# Patient Record
Sex: FEMALE | Race: ASIAN | Employment: FULL TIME | URBAN - METROPOLITAN AREA
[De-identification: names, ages, dates, MRNs, and addresses within clinical notes are randomized per-mention and may not be internally consistent; named-entity substitution may affect disease eponyms.]

---

## 2017-10-18 ENCOUNTER — OFFICE VISIT (OUTPATIENT)
Dept: FAMILY MEDICINE | Facility: CLINIC | Age: 22
End: 2017-10-18
Payer: COMMERCIAL

## 2017-10-18 VITALS
HEIGHT: 63 IN | BODY MASS INDEX: 22.32 KG/M2 | WEIGHT: 126 LBS | TEMPERATURE: 98.4 F | DIASTOLIC BLOOD PRESSURE: 62 MMHG | HEART RATE: 84 BPM | SYSTOLIC BLOOD PRESSURE: 110 MMHG | OXYGEN SATURATION: 99 %

## 2017-10-18 DIAGNOSIS — R53.83 FATIGUE, UNSPECIFIED TYPE: Primary | ICD-10-CM

## 2017-10-18 DIAGNOSIS — Z23 NEED FOR PROPHYLACTIC VACCINATION AND INOCULATION AGAINST INFLUENZA: ICD-10-CM

## 2017-10-18 LAB
BASOPHILS # BLD AUTO: 0 10E9/L (ref 0–0.2)
BASOPHILS NFR BLD AUTO: 0.5 %
DIFFERENTIAL METHOD BLD: NORMAL
EOSINOPHIL # BLD AUTO: 0.1 10E9/L (ref 0–0.7)
EOSINOPHIL NFR BLD AUTO: 1.1 %
ERYTHROCYTE [DISTWIDTH] IN BLOOD BY AUTOMATED COUNT: 12.2 % (ref 10–15)
HCT VFR BLD AUTO: 40.7 % (ref 35–47)
HGB BLD-MCNC: 13.5 G/DL (ref 11.7–15.7)
LYMPHOCYTES # BLD AUTO: 2.3 10E9/L (ref 0.8–5.3)
LYMPHOCYTES NFR BLD AUTO: 36.3 %
MCH RBC QN AUTO: 29.3 PG (ref 26.5–33)
MCHC RBC AUTO-ENTMCNC: 33.2 G/DL (ref 31.5–36.5)
MCV RBC AUTO: 88 FL (ref 78–100)
MONOCYTES # BLD AUTO: 0.5 10E9/L (ref 0–1.3)
MONOCYTES NFR BLD AUTO: 8.4 %
NEUTROPHILS # BLD AUTO: 3.4 10E9/L (ref 1.6–8.3)
NEUTROPHILS NFR BLD AUTO: 53.7 %
PLATELET # BLD AUTO: 243 10E9/L (ref 150–450)
RBC # BLD AUTO: 4.61 10E12/L (ref 3.8–5.2)
WBC # BLD AUTO: 6.4 10E9/L (ref 4–11)

## 2017-10-18 PROCEDURE — 82306 VITAMIN D 25 HYDROXY: CPT | Performed by: INTERNAL MEDICINE

## 2017-10-18 PROCEDURE — 82728 ASSAY OF FERRITIN: CPT | Performed by: INTERNAL MEDICINE

## 2017-10-18 PROCEDURE — 90471 IMMUNIZATION ADMIN: CPT | Performed by: INTERNAL MEDICINE

## 2017-10-18 PROCEDURE — 80050 GENERAL HEALTH PANEL: CPT | Performed by: INTERNAL MEDICINE

## 2017-10-18 PROCEDURE — 90686 IIV4 VACC NO PRSV 0.5 ML IM: CPT | Performed by: INTERNAL MEDICINE

## 2017-10-18 PROCEDURE — 99203 OFFICE O/P NEW LOW 30 MIN: CPT | Mod: 25 | Performed by: INTERNAL MEDICINE

## 2017-10-18 PROCEDURE — 36415 COLL VENOUS BLD VENIPUNCTURE: CPT | Performed by: INTERNAL MEDICINE

## 2017-10-18 NOTE — NURSING NOTE
"Chief Complaint   Patient presents with     Fatigue       Initial /62 (BP Location: Right arm, Patient Position: Chair, Cuff Size: Adult Regular)  Pulse 84  Temp 98.4  F (36.9  C) (Tympanic)  Ht 5' 3\" (1.6 m)  Wt 126 lb (57.2 kg)  LMP 10/08/2017  SpO2 99%  BMI 22.32 kg/m2 Estimated body mass index is 22.32 kg/(m^2) as calculated from the following:    Height as of this encounter: 5' 3\" (1.6 m).    Weight as of this encounter: 126 lb (57.2 kg).  Medication Reconciliation: complete  "

## 2017-10-18 NOTE — PROGRESS NOTES
"  SUBJECTIVE:   Rebecca Huerta is a 22 year old female who presents to clinic today for the following health issues:    Rebecca is here with fatigue. She has been bothered by this for the past year. At first thought it wasn't much to worry about, but has been persisting and feels like it is not normal she should feel this way. Describes feeling tired and wiped out. She usually gets 7-8 hours of sleep and wakes up feeling rested. 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 She did start this job about a year ago. She goes to the gym a couple times a week, feels like she cannot exercise as long as she used to, just feels worn out. Denies chest pain, palpitations, shortness of breath, MIRZA, muscle aches or weakness. She eats a regular diet, no decreased appetite or weight loss. She is having regular bowel movements. Is having regular periods without excessive flow. She does get headaches here and there. She denies rashes and joint pain.  She wondered if low iron or vitamin D might be contributing, has taken both of those supplements but didn't notice any improvement in her symptoms. She states her mood overall is good.    Rebecca is otherwise healthy. She does not take any regular medications.        Reviewed and updated as needed this visit by clinical staffTobacco  Allergies  Meds  Soc Hx      Reviewed and updated as needed this visit by Provider  Meds         ROS:  Constitutional, HEENT, cardiovascular, LAD, pulmonary, GI, , musculoskeletal, neuro, skin, endocrine and psych systems are negative, except as otherwise noted.      OBJECTIVE:   /62 (BP Location: Right arm, Patient Position: Chair, Cuff Size: Adult Regular)  Pulse 84  Temp 98.4  F (36.9  C) (Tympanic)  Ht 5' 3\" (1.6 m)  Wt 126 lb (57.2 kg)  LMP 10/08/2017  SpO2 99%  BMI 22.32 kg/m2  Body mass index is 22.32 kg/(m^2).    Gen: well appearing, pleasant young woman, no distress  HEENT: PERRL, sclera nonicteric, oropharynx clear, MMM  Neck: " supple, no LAD  Pulm: breathing comfortably, CTAB, no wheezes or rales  CV: RRR, normal S1 and S2, no murmurs  Abd: BS present, soft, nontender, nondistended, no HSM appreciated  Ext: 2+ distal pulses, no LE edema         ASSESSMENT/PLAN:       1. Fatigue, unspecified type  History and physical are nonspecific. We'll check basic labs today, follow up after results.  - CBC with platelets and differential  - TSH with free T4 reflex  - Vitamin D Deficiency  - Comprehensive metabolic panel (BMP + Alb, Alk Phos, ALT, AST, Total. Bili, TP)  - Ferritin      Advised she needs a follow-up visit for Pap smear. Has not had one previously.    Fouzia Trinidad MD  [facility]

## 2017-10-18 NOTE — MR AVS SNAPSHOT
"              After Visit Summary   10/18/2017    Rebecca Huerta    MRN: [id number]           Patient Information     Date Of Birth          1995        Visit Information        Provider Department      10/18/2017 4:20 PM Fouzia Trinidad MD [facility] [place]        Today's Diagnoses     Fatigue, unspecified type    -  1       Follow-ups after your visit        Who to contact     If you have questions or need follow up information about today's clinic visit or your schedule please contact [facility] [place] [place] directly at [phone].  Normal or non-critical lab and imaging results will be communicated to you by Printed Piecehart, letter or phone within 4 business days after the clinic has received the results. If you do not hear from us within 7 days, please contact the clinic through Printed Piecehart or phone. If you have a critical or abnormal lab result, we will notify you by phone as soon as possible.  Submit refill requests through Showcase Gig or call your pharmacy and they will forward the refill request to us. Please allow 3 business days for your refill to be completed.          Additional Information About Your Visit        MyChart Information     Show[employer] Gig lets you send messages to your doctor, view your test results, renew your prescriptions, schedule appointments and more. To sign up, go to [email or web address] . Click on \"Log in\" on the left side of the screen, which will take you to the Welcome page. Then click on \"Sign up Now\" on the right side of the page.     You will be asked to enter the access code listed below, as well as some personal information. Please follow the directions to create your username and password.     Your access code is: [email or web address]  Expires: 2018  4:52 PM     Your access code will  in 90 days. If you need help or a new code, please call your [facility] or [phone].        Care EveryWhere ID     This is your Care EveryWhere ID. This could be used by " "other organizations to access your [place] medical records  [id number]        Your Vitals Were     Pulse Temperature Height Last Period Pulse Oximetry BMI (Body Mass Index)    84 98.4  F (36.9  C) (Tympanic) 5' 3\" (1.6 m) 10/08/2017 99% 22.32 kg/m2       Blood Pressure from Last 3 Encounters:   10/18/17 110/62    Weight from Last 3 Encounters:   10/18/17 126 lb (57.2 kg)              We Performed the Following     CBC with platelets and differential     Comprehensive metabolic panel (BMP + Alb, Alk Phos, ALT, AST, Total. Bili, TP)     Ferritin     TSH with free T4 reflex     Vitamin D Deficiency        Primary Care Provider    Physician No Ref-Primary       NO REF-PRIMARY PHYSICIAN        Equal Access to Services     ADAM HARLEY : Hadii suzanne Rodriguez, waaxda luqadaha, qaybta kaalmada leslyeyagarrett, ramsey green . So [facility] [phone].    ATENCIÓN: Si habla español, tiene a vora disposición servicios gratuitos de asistencia lingüística. Llame al [phone].    We comply with applicable federal civil rights laws and [place] laws. We do not discriminate on the basis of race, color, national origin, age, disability, sex, sexual orientation, or gender identity.            Thank you!     Thank you for choosing [facility] [place] [place]  for your care. Our goal is always to provide you with excellent care. Hearing back from our patients is one way we can continue to improve our services. Please take a few minutes to complete the written survey that you may receive in the mail after your visit with us. Thank you!             Your Updated Medication List - Protect others around you: Learn how to safely use, store and throw away your medicines at www.disposemymeds.org.      Notice  As of 10/18/2017  4:52 PM    You have not been prescribed any medications.      "

## 2017-10-18 NOTE — PROGRESS NOTES
Injectable Influenza Immunization Documentation    1.  Is the person to be vaccinated sick today?   No    2. Does the person to be vaccinated have an allergy to a component   of the vaccine?   No    3. Has the person to be vaccinated ever had a serious reaction   to influenza vaccine in the past?   No    4. Has the person to be vaccinated ever had Guillain-Barré syndrome?   No    Form completed by mp

## 2017-10-19 LAB
ALBUMIN SERPL-MCNC: 4.2 G/DL (ref 3.4–5)
ALP SERPL-CCNC: 66 U/L (ref 40–150)
ALT SERPL W P-5'-P-CCNC: 18 U/L (ref 0–50)
ANION GAP SERPL CALCULATED.3IONS-SCNC: 9 MMOL/L (ref 3–14)
AST SERPL W P-5'-P-CCNC: 17 U/L (ref 0–45)
BILIRUB SERPL-MCNC: 0.8 MG/DL (ref 0.2–1.3)
BUN SERPL-MCNC: 9 MG/DL (ref 7–30)
CALCIUM SERPL-MCNC: 9.3 MG/DL (ref 8.5–10.1)
CHLORIDE SERPL-SCNC: 102 MMOL/L (ref 94–109)
CO2 SERPL-SCNC: 28 MMOL/L (ref 20–32)
CREAT SERPL-MCNC: 0.66 MG/DL (ref 0.52–1.04)
FERRITIN SERPL-MCNC: 26 NG/ML (ref 12–150)
GFR SERPL CREATININE-BSD FRML MDRD: >90 ML/MIN/1.7M2
GLUCOSE SERPL-MCNC: 89 MG/DL (ref 70–99)
POTASSIUM SERPL-SCNC: 3.7 MMOL/L (ref 3.4–5.3)
PROT SERPL-MCNC: 8.4 G/DL (ref 6.8–8.8)
SODIUM SERPL-SCNC: 139 MMOL/L (ref 133–144)
TSH SERPL DL<=0.005 MIU/L-ACNC: 3.32 MU/L (ref 0.4–4)

## 2017-10-20 ENCOUNTER — MYC MEDICAL ADVICE (OUTPATIENT)
Dept: FAMILY MEDICINE | Facility: CLINIC | Age: 22
End: 2017-10-20

## 2017-10-20 LAB — DEPRECATED CALCIDIOL+CALCIFEROL SERPL-MC: 17 UG/L (ref 20–75)

## 2017-10-23 NOTE — TELEPHONE ENCOUNTER
Please see My Chart message below and advise as appropriate.  Taylor Diallo RN - Triage  [facility]    10/18/2017 LOV NOTES:  . Fatigue, unspecified type  History and physical are nonspecific. We'll check basic labs today, follow up after results.  - CBC with platelets and differential  - TSH with free T4 reflex  - Vitamin D Deficiency  - Comprehensive metabolic panel (BMP + Alb, Alk Phos, ALT, AST, Total. Bili, TP)  - Ferritin

## 2017-10-30 ENCOUNTER — ALLIED HEALTH/NURSE VISIT (OUTPATIENT)
Dept: NURSING | Facility: CLINIC | Age: 22
End: 2017-10-30
Payer: COMMERCIAL

## 2017-10-30 DIAGNOSIS — Z23 NEED FOR VACCINATION: Primary | ICD-10-CM

## 2017-10-30 PROCEDURE — 90714 TD VACC NO PRESV 7 YRS+ IM: CPT

## 2017-10-30 PROCEDURE — 90471 IMMUNIZATION ADMIN: CPT

## 2017-10-30 PROCEDURE — 90651 9VHPV VACCINE 2/3 DOSE IM: CPT

## 2017-10-30 PROCEDURE — 90472 IMMUNIZATION ADMIN EACH ADD: CPT

## 2018-01-28 ENCOUNTER — HEALTH MAINTENANCE LETTER (OUTPATIENT)
Age: 23
End: 2018-01-28

## 2018-10-27 ENCOUNTER — APPOINTMENT (OUTPATIENT)
Dept: GENERAL RADIOLOGY | Facility: CLINIC | Age: 23
End: 2018-10-27
Attending: PHYSICIAN ASSISTANT
Payer: COMMERCIAL

## 2018-10-27 ENCOUNTER — HOSPITAL ENCOUNTER (EMERGENCY)
Facility: CLINIC | Age: 23
Discharge: HOME | End: 2018-10-27
Attending: EMERGENCY MEDICINE | Admitting: EMERGENCY MEDICINE
Payer: COMMERCIAL

## 2018-10-27 VITALS
SYSTOLIC BLOOD PRESSURE: 107 MMHG | WEIGHT: 123 LBS | TEMPERATURE: 98.9 F | HEIGHT: 63 IN | BODY MASS INDEX: 21.79 KG/M2 | DIASTOLIC BLOOD PRESSURE: 77 MMHG | RESPIRATION RATE: 19 BRPM | OXYGEN SATURATION: 100 % | HEART RATE: 132 BPM

## 2018-10-27 DIAGNOSIS — R09.A2 GLOBUS PHARYNGEUS: ICD-10-CM

## 2018-10-27 LAB
DEPRECATED S PYO AG THROAT QL EIA: NORMAL
SPECIMEN SOURCE: NORMAL
TSH SERPL DL<=0.005 MIU/L-ACNC: 2.88 MU/L (ref 0.4–4)

## 2018-10-27 PROCEDURE — 99284 EMERGENCY DEPT VISIT MOD MDM: CPT

## 2018-10-27 PROCEDURE — 70360 X-RAY EXAM OF NECK: CPT

## 2018-10-27 PROCEDURE — 87081 CULTURE SCREEN ONLY: CPT | Performed by: PHYSICIAN ASSISTANT

## 2018-10-27 PROCEDURE — 87880 STREP A ASSAY W/OPTIC: CPT | Performed by: PHYSICIAN ASSISTANT

## 2018-10-27 PROCEDURE — 84443 ASSAY THYROID STIM HORMONE: CPT | Performed by: PHYSICIAN ASSISTANT

## 2018-10-27 ASSESSMENT — ENCOUNTER SYMPTOMS
COUGH: 0
TROUBLE SWALLOWING: 0
HEADACHES: 1
SORE THROAT: 0

## 2018-10-27 NOTE — ED AVS SNAPSHOT
Emergency Department    [address] [place] [address]    Phone:  [phone]    Fax:  [phone]                                       Rebecca Huerta   MRN: [id number]    Department:   Emergency Department   Date of Visit:  10/27/2018           After Visit Summary Signature Page     I have received my discharge instructions, and my questions have been answered. I have discussed any challenges I see with this plan with the nurse or doctor.    ..........................................................................................................................................  Patient/Patient Representative Signature      ..........................................................................................................................................  Patient Representative Print Name and Relationship to Patient    ..................................................               ................................................  Date                                   Time    ..........................................................................................................................................  Reviewed by Signature/Title    ...................................................              ..............................................  Date                                               Time          22EPIC Rev 08/18

## 2018-10-27 NOTE — ED AVS SNAPSHOT
Emergency Department    [address] [facility] [address]    Phone:  [phone]    Fax:  [phone]                                       Rebecca Huerta   MRN: [id number]    Department:   Emergency Department   Date of Visit:  10/27/2018           Patient Information     Date Of Birth          1995        Your diagnoses for this visit were:     Globus pharyngeus        You were seen by Sergei Etienne MD.      Follow-up Information     Follow up with Sharri Umanzor MD.    Specialty:  Otolaryngology    Why:  As needed    Contact information:    [facility]  [address] [address]  [phone]          Follow up with  Emergency Department.    Specialty:  EMERGENCY MEDICINE    Why:  As needed    Contact information:    [address] [facility] [id number] [phone]      Your next 10 appointments already scheduled     Oct 31, 2018  8:45 AM CDT   New Patient with Acacia Lipscomb MD   [facility]    [address] [place] [address] [phone]              24 Hour Appointment Hotline       To make an appointment at any [facility], call [phone] ([phone]). If you don't have a family doctor or clinic, we will help you find one. [facility] are conveniently located to serve the needs of you and your family.             Review of your medicines      Notice     You have not been prescribed any medications.            Procedures and tests performed during your visit     Beta strep group A culture    Neck soft tissue XR    Rapid strep screen    TSH with free T4 reflex      Orders Needing Specimen Collection     None      Pending Results     Date and Time Order Name Status Description    10/27/2018 1954 Neck soft tissue XR Preliminary     10/27/2018 1950 Beta strep group A culture In process             Pending Culture Results     Date  and Time Order Name Status Description    10/27/2018 1950 Beta strep group A culture In process             Pending Results Instructions     If you had any lab results that were not finalized at the time of your Discharge, you can call the ED Lab Result RN at [phone]. You will be contacted by this team for any positive Lab results or changes in treatment. The nurses are available 7 days a week from 10A to 6:30P.  You can leave a message 24 hours per day and they will return your call.        Test Results From Your Hospital Stay        10/27/2018  8:16 PM      Component Results     Component    Specimen Description    Throat    Rapid Strep A Screen    NEGATIVE: No Group A streptococcal antigen detected by immunoassay, await culture report.         10/27/2018  8:21 PM      Narrative     NECK SOFT TISSUE 10/27/2018 8:12 PM     COMPARISON: None.    HISTORY: Sensation of deep throat swelling.        Impression     IMPRESSION: Soft tissue contours of the aerodigestive tract from the  nasopharynx to the upper trachea appear normal. No radiopaque foreign  bodies identified. No soft tissue gas identified. There is no  prevertebral soft tissue swelling.          10/27/2018  8:17 PM         10/27/2018  9:32 PM      Component Results     Component Value Ref Range & Units Status    TSH 2.88 0.40 - 4.00 mU/L Final                Clinical Quality Measure: Blood Pressure Screening     Your blood pressure was checked while you were in the emergency department today. The last reading we obtained was  BP: 119/78 . Please read the guidelines below about what these numbers mean and what you should do about them.  If your systolic blood pressure (the top number) is less than 120 and your diastolic blood pressure (the bottom number) is less than 80, then your blood pressure is normal. There is nothing more that you need to do about it.  If your systolic blood pressure (the top number) is 120-139 or your diastolic blood pressure (the  bottom number) is 80-89, your blood pressure may be higher than it should be. You should have your blood pressure rechecked within a year by a primary care provider.  If your systolic blood pressure (the top number) is 140 or greater or your diastolic blood pressure (the bottom number) is 90 or greater, you may have high blood pressure. High blood pressure is treatable, but if left untreated over time it can put you at risk for heart attack, stroke, or kidney failure. You should have your blood pressure rechecked by a primary care provider within the next 4 weeks.  If your provider in the emergency department today gave you specific instructions to follow-up with your doctor or provider even sooner than that, you should follow that instruction and not wait for up to 4 weeks for your follow-up visit.        Thank you for choosing [facility]       Thank you for choosing [facility] for your care. Our goal is always to provide you with excellent care. Hearing back from our patients is one way we can continue to improve our services. Please take a few minutes to complete the written survey that you may receive in the mail after you visit with us. Thank you!        Waffle Information     Waffle gives you secure access to your electronic health record. If you see a primary care provider, you can also send messages to your care team and make appointments. If you have questions, please call your primary care clinic.  If you do not have a primary care provider, please call [phone] and they will assist you.        Care EveryWhere ID     This is your Care EveryWhere ID. This could be used by other organizations to access your [facility] medical records  [id number]        Equal Access to Services     ADAM HARLEY : Hadii suzanne Rodriguez, waaxda venkata, qaybta kaalramsey kumar . So [facility] [phone].    ATENCIÓN: Si habla español, tiene a vora disposición servicios gratuitos  de asistencia lingüística. Stanislaw ronquillo [phone].    We comply with applicable federal civil rights laws and [place] laws. We do not discriminate on the basis of race, color, national origin, age, disability, sex, sexual orientation, or gender identity.            After Visit Summary       This is your record. Keep this with you and show to your community pharmacist(s) and doctor(s) at your next visit.

## 2018-10-28 NOTE — ED NOTES
"PT presented to the triage desk stating \"I can not breathe.\" Vitaled PT, Oxygen 100%, heart rate 98bpm, respiratory rate 19/min. PT does not appear to be in distress, monitored PT. RN notified. PT roomed.  "

## 2018-10-28 NOTE — ED PROVIDER NOTES
"  History     Chief Complaint:  Oral Swelling     HPI   Rebecca Huerta is a 23 year old female up to date on immunizations who presents to the emergency department today for evaluation of oral swelling. The patient reports that she developed a swelling and throbbing in her throat and a \"tight\" headache that has persisted intermittently since onset. She states that today the swelling worsened significantly, noting a feeling as though \"something is in her throat\" and difficulty breathing \"like she is panicking,\" prompting presentation. She denies any cough, nasal congestion, sore throat, fever, difficulty eating, drinking, or swallowing, or known sick contacts.     Allergies:  No Known Drug Allergies    Medications:    Medications reviewed. No current medications.     Past Medical History:    Medical history reviewed. No pertinent medical history.    Past Surgical History:    Surgical history reviewed. No pertinent surgical history.    Family History:    Family history reviewed. No pertinent family history.     Social History:  Smoking Status: Never Smoker  Smokeless Tobacco: Never Used  Alcohol Use: Negative  Drug Use: Negative  Marital Status:  Single      Review of Systems   HENT: Negative for congestion, sore throat and trouble swallowing.         Throat swelling and throbbing   Respiratory: Negative for cough.    Neurological: Positive for headaches.   All other systems reviewed and are negative.    Physical Exam     Patient Vitals for the past 24 hrs:   BP Temp Temp src Pulse Heart Rate Resp SpO2 Height Weight   10/27/18 2144 - - - - - - 100 % - -   10/27/18 2143 107/77 - - - - - - - -   10/27/18 1940 - - - - 98 19 100 % - -   10/27/18 1913 119/78 98.9  F (37.2  C) Oral 132 - 18 100 % 1.6 m (5' 3\") 55.8 kg (123 lb)     Physical Exam  General: Mildly anxious, no acute distress.  Normal mood and affect.  Skin: Good turgor, no rash, no unusual bruising or prominent lesions.  HEENT: Head: Normocephalic, atraumatic, no " visible masses.   Eyes: Conjunctiva clear, sclera non-icteric, EOM intact, PERRL.   Ears: EACs clear, TMs translucent.   Throat/pharynx: Mucous membranes moist, no mucosal lesions. Mucosa non-inflamed, no tonsillar hypertrophy or exudate.   Neck: Supple, without lymphadenopathy.  Cardiac: Tachycardic, regular rhythm, no murmur or gallop.   Lungs: Clear to auscultation.  Abdomen: Bowel sounds normal, no tenderness, organomegaly, masses, or hernia. No guarding or rebound tenderness.   Musculoskeletal: Normal gait and station. No calf tenderness or swelling.   Neurologic: Oriented x 3. GCS: 15.  Psychiatric: Intact recent and remote memory, judgment and insight. Mildly anxious and tearful.     Emergency Department Course     Imaging:  Radiology findings were communicated with the patient who voiced understanding of the findings.    Neck soft tissue XR  Soft tissue contours of the aerodigestive tract from the  nasopharynx to the upper trachea appear normal. No radiopaque foreign  bodies identified. No soft tissue gas identified. There is no  prevertebral soft tissue swelling.   Reading per radiology    Laboratory:  Laboratory findings were communicated with the patient who voiced understanding of the findings.    TSH with Free T4 Reflex: 2.88  Rapid Strep Screen: negative  Beta Strep Group A Culture: pending    Emergency Department Course:    1943 Nursing notes and vitals reviewed.    1948 I performed an exam of the patient as documented above.     1950 A throat sample was obtained for laboratory testing as documented above.    2008 The patient was sent for a soft tissue xray while in the emergency department, results above.     2146 I personally reviewed the laboratory and imaging results with the patient and answered all related questions prior to discharge.    Impression & Plan      Medical Decision Making:  Rebecca Huerta is a 23 year old female who presents to the emergency department today for evaluation of sensation  of throat fullness/swelling.  Details of the patient's history can be noted in the HPI.  Differential diagnosis included strep pharyngitis, retropharyngeal abscess, peritonsillar abscess, epiglottitis, foreign body, anaphylaxis, thyroid abnormalities, amongst others.  Upon my exam, the patient was mildly anxious but well-appearing.  She is having no difficulty breathing, swallowing, talking.  Rapid strep test was completed and returned negative.  Neck soft tissue x-ray was completed to evaluate for any signs of swelling to suggest epiglottitis or retropharyngeal abscess.  This returned within normal limits. In light of her throat fullness and tachycardia, TSH was also completed.  This returned within normal limits.  I believe that the patient's tachycardia may largely due to her anxiety over her symptoms.  There is an unclear cause of the patient's discomfort here today.  She appears to be breathing well, is vitally stable, is in no acute respiratory distress.  I feel comfortable with her discharge and outpatient management.  She was told to follow-up with her primary care provider early next week for recheck of symptoms.  She is also given a local ENT information that she may contact next week if she continues to have symptoms.  Upper endoscopy could be considered at that time.  She will return for any changing or worsening symptoms, difficulty breathing, difficulty swallowing, fevers >101F, new concerns.  She will try and take Benadryl at home if your symptoms persist.  All questions were answered prior the patient's discharge.  She was in agreement with the treatment plan as stated above.    Diagnosis:    ICD-10-CM    1. 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    Discharge Medications:  No discharge medications.    Scribe Disclosure:  Alessandra BALES, am serving as a scribe at 7:46 PM on 10/27/2018 to document services personally performed by Carly Wallace PA based on  my observations and the provider's statements to me.     EMERGENCY DEPARTMENT       Carly Wallace PA  10/27/18 7822

## 2018-10-28 NOTE — ED PROVIDER NOTES
"Emergency Department Attending Supervision Note  10/27/2018  8:22 PM      I evaluated this patient in conjunction with Advanced Practice Clinician:    Briefly, the patient presented with deep throat fullness.  Symptoms were intermittent but more persistent today. Non-smoker. No fevers or sore throat.      Examination:   ED Triage Vitals   Enc Vitals Group      BP 10/27/18 1913 119/78      Pulse 10/27/18 1913 132      Resp 10/27/18 1913 18      Temp 10/27/18 1913 98.9  F (37.2  C)      Temp src 10/27/18 1913 Oral      SpO2 10/27/18 1913 100 %      Weight 10/27/18 1913 55.8 kg (123 lb)      Height 10/27/18 1913 1.6 m (5' 3\")      Head Cir --       Peak Flow --       Pain Score --       Pain Loc --       Pain Edu? --       Excl. in GC? --      Head:  The scalp, face, and head appear normal  Eyes:  The pupils are normal    Conjunctivae and sclera appear normal  ENT:    The nose is normal    Ears/pinnae are normal    No masses palpated  Neck:  Normal range of motion  Lungs: The lungs are clear  Heart:  Regular rate, normal rhythm, no murmur  Skin:  No rash or acute lesions noted.  Neuro: Speech is normal and fluent.  No focal deficits.    My impression/diagnosis is:    Rebecca Huerta is a 23 year old female who presents to the ED with abnormal sensation in the throat.  No physical exam abnormalities. No masses. Speech and voice is normal. Swallowing normally in the ED.  No stridor. Rapid strep is negative. Encouraged close monitoring of symptoms and follow up with PCP. She was also given information for ENT follow up.    1. Globus pharyngeus         MD Jaimie Abebe Aaron Joseph, MD  10/27/18 8239    "

## 2018-10-29 LAB
BACTERIA SPEC CULT: NORMAL
Lab: NORMAL
SPECIMEN SOURCE: NORMAL

## 2018-10-31 ENCOUNTER — OFFICE VISIT (OUTPATIENT)
Dept: INTERNAL MEDICINE | Facility: CLINIC | Age: 23
End: 2018-10-31
Payer: COMMERCIAL

## 2018-10-31 VITALS
HEIGHT: 62 IN | HEART RATE: 80 BPM | RESPIRATION RATE: 16 BRPM | OXYGEN SATURATION: 100 % | SYSTOLIC BLOOD PRESSURE: 106 MMHG | DIASTOLIC BLOOD PRESSURE: 76 MMHG | BODY MASS INDEX: 22.6 KG/M2 | TEMPERATURE: 98.4 F | WEIGHT: 122.8 LBS

## 2018-10-31 DIAGNOSIS — Z23 NEED FOR VACCINATION: ICD-10-CM

## 2018-10-31 DIAGNOSIS — Z00.00 ROUTINE HISTORY AND PHYSICAL EXAMINATION OF ADULT: Primary | ICD-10-CM

## 2018-10-31 DIAGNOSIS — F41.9 ANXIETY: ICD-10-CM

## 2018-10-31 PROCEDURE — 90471 IMMUNIZATION ADMIN: CPT | Performed by: INTERNAL MEDICINE

## 2018-10-31 PROCEDURE — 99395 PREV VISIT EST AGE 18-39: CPT | Mod: 25 | Performed by: INTERNAL MEDICINE

## 2018-10-31 PROCEDURE — 90651 9VHPV VACCINE 2/3 DOSE IM: CPT | Performed by: INTERNAL MEDICINE

## 2018-10-31 RX ORDER — ALPRAZOLAM 0.25 MG
0.25 TABLET ORAL DAILY PRN
Qty: 30 TABLET | Refills: 0 | Status: SHIPPED | OUTPATIENT
Start: 2018-10-31 | End: 2019-12-15

## 2018-10-31 NOTE — PROGRESS NOTES
"  SUBJECTIVE:                                                      HPI: Rebecca Huerta is a pleasant 23 year old female who presents for a physical.    Patient complains of frequent episodes of \"panic\" over the last several weeks.  No obvious new stressors or changes in her life. Feeling of panic comes on suddenly and can last hours. Symptoms include shortness of breath, racing heart, sweating, tension headache, and feeling of doom.    Reports that her mom likely suffers from anxiety but has never been treated. Patient recalls her mom presenting to the ER multiple times with the same symptoms.    ROS:  Constitutional: denies unintentional weight loss or gain; denies fevers, chills, or sweats     Cardiovascular: denies chest pain, palpitations, or edema  Respiratory: denies cough, wheezing, shortness of breath, or dyspnea on exertion  Gastrointestinal: denies nausea, vomiting, constipation, diarrhea, or abdominal pain  Genitourinary: denies urinary frequency, urgency, dysuria, or hematuria  Integumentary: denies rash or pruritus  Musculoskeletal: denies back pain, muscle pain, joint pain, or joint swelling  Neurologic: denies focal weakness, numbness, or tingling  Hematologic/Immunologic: denies history of anemia or blood transfusions  Endocrine: denies heat or cold intolerance; denies polyuria, polydipsia  Psychiatric: see above    Past Medical History:   Diagnosis Date     NO ACTIVE PROBLEMS      Past Surgical History:   Procedure Laterality Date     NO HISTORY OF SURGERY       Family History   Problem Relation Age of Onset     Colon Cancer Father 64     Diabetes No family hx of      Cerebrovascular Disease No family hx of      Myocardial Infarction No family hx of      Coronary Artery Disease Early Onset No family hx of      Breast Cancer No family hx of      Ovarian Cancer No family hx of      Social History Main Topics     Smoking status: Never Smoker     Smokeless tobacco: Never Used     Alcohol use No     Drug " "use: No     Sexual activity: No     No Known Allergies     MEDS: None    Immunization History   Administered Date(s) Administered     HPV9 10/30/2017     Hep B, Peds or Adolescent 1993, 1995, 1993     HepA-ped 2 Dose 05/18/2006     Influenza Vaccine IM 3yrs+ 4 Valent IIV4 10/18/2011, 12/17/2012, 10/18/2017     MMR 08/08/1994     Poliovirus, inactivated (IPV) 1993, 1993, 08/08/1995     TD (ADULT, 7+) 10/30/2017     TDAP Vaccine (Adacel) 07/19/2005     Typhoid IM 05/18/2006     OBJECTIVE:                                                      /76  Pulse 80  Temp 98.4  F (36.9  C) (Oral)  Resp 16  Ht 5' 2\" (1.575 m)  Wt 122 lb 12.8 oz (55.7 kg)  LMP 10/01/2018  SpO2 100%  BMI 22.46 kg/m2  Constitutional: well-appearing  Eyes: normal conjunctivae and lids; pupils equal, round, and reactive to light  Ears, Nose, Mouth, and Throat: normal ears and nose; tympanic membranes visualized and normal; normal lips, teeth, and gums; no oropharyngeal lesions or ulcers  Neck: supple and symmetric; no lymphadenopathy; no thyromegaly or masses  Respiratory: normal respiratory effort; clear to auscultation bilaterally  Cardiovascular: regular rate and rhythm; pedal pulses palpable; no edema  Gastrointestinal: soft, non-tender, non-distended, and bowel sounds present; no organomegaly or masses  Musculoskeletal: normal gait and station  Psych: normal judgment and insight; normal mood and affect; recent and remote memory intact; oriented to time, place, and person    PREVENTATIVE HEALTH                                                      BMI: within normal limits   Blood pressure: within normal limits   Breast CA screening: not medically indicated at this time   Cervical CA screening: Deferring until sexually active  Colon CA screening: not medically indicated at this time   Lung CA screening: n/a   Dexa: not medically indicated at this time   Screening HCV: n/a   Screening HIV: not medically " indicated at this time   Screening cholesterol: not medically indicated at this time   Screening diabetes: not medically indicated at this time   STD testing: no risk factors present  Depression screening: PHQ-2 assessment completed and reviewed - no intervention indicated at this time  Alcohol misuse screening: alcohol use reviewed - no intervention indicated at this time  Immunizations: reviewed; HPV#2 DUE; flu shot DUE - will get at work    ASSESSMENT/PLAN:                                                       (Z00.00) Routine history and physical examination of adult  (primary encounter diagnosis)  Comment: PMH, PSH, FH, SH, medications, allergies, immunizations, and preventative health measures reviewed.   Plan: see below for plans.     (Z23) Need for vaccination  Plan: HPV#2 today.     (F41.9) Anxiety  Plan: Xanax sparingly as needed for severe anxiety.    The instructions on the AVS were discussed and explained to the patient. Patient expressed understanding of instructions.    (Chart documentation was completed, in part, with [employer] voice-recognition software. Even though reviewed, some grammatical, spelling, and word errors may remain.)    Acacia Lipscomb MD   [address] [address]  T: [phone], F: [phone]

## 2018-10-31 NOTE — MR AVS SNAPSHOT
After Visit Summary   10/31/2018    Rebecca Huerta    MRN: [id number]           Patient Information     Date Of Birth          1995        Visit Information        Provider Department      10/31/2018 8:45 AM Acacia Lipscomb MD [facility]        Today's Diagnoses     Routine history and physical examination of adult    -  1    Need for vaccination        Anxiety          Care Instructions    HPV#2 today.    #3 in ~4 months.    ---    Xanax SPARINGLY as need for extreme anxiety. Do not drive or drink alcohol while taking this.           Follow-ups after your visit        Who to contact     If you have questions or need follow up information about today's clinic visit or your schedule please contact [facility] directly at [phone].  Normal or non-critical lab and imaging results will be communicated to you by MyChart, letter or phone within 4 business days after the clinic has received the results. If you do not hear from us within 7 days, please contact the clinic through MyChart or phone. If you have a critical or abnormal lab result, we will notify you by phone as soon as possible.  Submit refill requests through [employer] or call your pharmacy and they will forward the refill request to us. Please allow 3 business days for your refill to be completed.          Additional Information About Your Visit        MyChart Information     [employer] gives you secure access to your electronic health record. If you see a primary care provider, you can also send messages to your care team and make appointments. If you have questions, please call your primary care clinic.  If you do not have a primary care provider, please call [phone] and they will assist you.        Care EveryWhere ID     This is your Care EveryWhere ID. This could be used by other organizations to access your [place] medical records  [id number]        Your Vitals Were     Pulse  "Temperature Respirations Height Last Period Pulse Oximetry    80 98.4  F (36.9  C) (Oral) 16 5' 2\" (1.575 m) 10/01/2018 100%    BMI (Body Mass Index)                   22.46 kg/m2            Blood Pressure from Last 3 Encounters:   10/31/18 106/76   10/27/18 107/77   10/18/17 110/62    Weight from Last 3 Encounters:   10/31/18 122 lb 12.8 oz (55.7 kg)   10/27/18 123 lb (55.8 kg)   10/18/17 126 lb (57.2 kg)              We Performed the Following     1st  Administration  [[id number]]     HUMAN PAPILLOMA VIRUS (GARDASIL 9) VACCINE [[id number]]          Today's Medication Changes          These changes are accurate as of 10/31/18  9:11 AM.  If you have any questions, ask your nurse or doctor.               Start taking these medicines.        Dose/Directions    ALPRAZolam 0.25 MG tablet   Commonly known as:  XANAX   Used for:  Anxiety   Started by:  Acacia Lipscomb MD        Dose:  0.25 mg   Take 1 tablet (0.25 mg) by mouth daily as needed for anxiety   Quantity:  30 tablet   Refills:  0            Where to get your medicines      Some of these will need a paper prescription and others can be bought over the counter.  Ask your nurse if you have questions.     Bring a paper prescription for each of these medications     ALPRAZolam 0.25 MG tablet                Primary Care Provider Office Phone # Fax #    [facility] [phone]       [address] [place] [address]        Equal Access to Services     PAGE HARLEY AH: Hadmack thomas Sodelbert, waaxda luqadaha, qaybta kaalmada aderoniyagarrett, waxay idiin hayaan aderoni barker. So [facility] [phone].    ATENCIÓN: Si habla español, tiene a vora disposición servicios gratuitos de asistencia lingüística. Llame al [phone].    We comply with applicable federal civil rights laws and [place] laws. We do not discriminate on the basis of race, color, national origin, age, disability, sex, sexual orientation, or gender " identity.            Thank you!     Thank you for choosing [facility]  for your care. Our goal is always to provide you with excellent care. Hearing back from our patients is one way we can continue to improve our services. Please take a few minutes to complete the written survey that you may receive in the mail after your visit with us. Thank you!             Your Updated Medication List - Protect others around you: Learn how to safely use, store and throw away your medicines at www.disposemymeds.org.          This list is accurate as of 10/31/18  9:11 AM.  Always use your most recent med list.                   Brand Name Dispense Instructions for use Diagnosis    ALPRAZolam 0.25 MG tablet    XANAX    30 tablet    Take 1 tablet (0.25 mg) by mouth daily as needed for anxiety    Anxiety

## 2018-10-31 NOTE — PATIENT INSTRUCTIONS
HPV#2 today.    #3 in ~4 months.    ---    Xanax SPARINGLY as need for extreme anxiety. Do not drive or drink alcohol while taking this.

## 2019-03-20 ENCOUNTER — TELEPHONE (OUTPATIENT)
Dept: INTERNAL MEDICINE | Facility: CLINIC | Age: 24
End: 2019-03-20

## 2019-03-20 NOTE — TELEPHONE ENCOUNTER
Panel Management Review          Composite cancer screening  Chart review shows that this patient is due/due soon for the following Pap Smear  Summary:    Patient is due/failing the following:   PAP    Action needed:   Patient needs office visit for PAP.    Type of outreach:    Sent letter.    Questions for provider review:    None                                                                                                                                    Alexandra Dhaliwal MA

## 2019-03-20 NOTE — LETTER
[facility]  [address] [address]  [phone]  March 20, 2019    Rebecca Huerta  [address]  [address]  [place] [address]    Dear Rebecca,    We care about your health and based on a review of your medical records, recommend the the following, to better manage your health:      You are in particular need of attention regarding:  -Cervical Cancer Screening    I am recommending that you:     -schedule a PAP SMEAR EXAM which is due.  Please disregard this reminder if you have had this exam elsewhere within the last year.  It would be helpful for us to have a copy of your recent pap smear report in our file so that we can best coordinate your care.    If you are under/uninsured, we recommend you contact the [facility]. They offer pap smears at no charge or on a sliding fee charge. You can schedule with them at [phone]. Please have them send us the results.      Here is a list of Health Maintenance topics that are due now or due soon:  Health Maintenance Due   Topic Date Due     HIV SCREEN (SYSTEM ASSIGNED)  01/04/2011     Pap Smear - every 3 years  01/04/2014     HPV Vaccine (3 - Female 3-dose series) 01/23/2019       Please call us at [phone] or [phone] (or use [employer]) to address the above recommendations.     Thank you for trusting [facility].  We appreciate the opportunity to serve you and look forward to supporting your healthcare needs in the future.    If you have (or plan to have) any of these tests done at a facility other than a [facility], [place] [facility] or a [facility], please have the results from these tests sent to your primary physician at [facility].    Healthy Regards,    Acacia Lipscomb MD

## 2019-08-05 ENCOUNTER — E-VISIT (OUTPATIENT)
Dept: FAMILY MEDICINE | Facility: CLINIC | Age: 24
End: 2019-08-05
Payer: COMMERCIAL

## 2019-08-05 DIAGNOSIS — R51.9 HEADACHE, UNSPECIFIED HEADACHE TYPE: Primary | ICD-10-CM

## 2019-08-05 PROCEDURE — 99444 ZZC PHYSICIAN ONLINE EVALUATION & MANAGEMENT SERVICE: CPT | Performed by: INTERNAL MEDICINE

## 2019-08-05 RX ORDER — CYCLOBENZAPRINE HCL 5 MG
5-10 TABLET ORAL 3 TIMES DAILY PRN
Qty: 30 TABLET | Refills: 1 | Status: SHIPPED | OUTPATIENT
Start: 2019-08-05 | End: 2019-11-06

## 2019-08-05 RX ORDER — INDOMETHACIN 25 MG/1
50 CAPSULE ORAL 2 TIMES DAILY PRN
Qty: 30 CAPSULE | Refills: 1 | Status: SHIPPED | OUTPATIENT
Start: 2019-08-05 | End: 2019-11-06

## 2019-11-06 ENCOUNTER — OFFICE VISIT (OUTPATIENT)
Dept: FAMILY MEDICINE | Facility: CLINIC | Age: 24
End: 2019-11-06
Payer: COMMERCIAL

## 2019-11-06 VITALS
TEMPERATURE: 96.7 F | DIASTOLIC BLOOD PRESSURE: 74 MMHG | SYSTOLIC BLOOD PRESSURE: 102 MMHG | HEIGHT: 62 IN | BODY MASS INDEX: 22.45 KG/M2 | HEART RATE: 74 BPM | WEIGHT: 122 LBS | OXYGEN SATURATION: 100 %

## 2019-11-06 DIAGNOSIS — R51.9 HEADACHE, UNSPECIFIED HEADACHE TYPE: ICD-10-CM

## 2019-11-06 DIAGNOSIS — Z00.00 ROUTINE GENERAL MEDICAL EXAMINATION AT A HEALTH CARE FACILITY: Primary | ICD-10-CM

## 2019-11-06 DIAGNOSIS — Z23 NEED FOR VACCINATION: ICD-10-CM

## 2019-11-06 PROCEDURE — 99395 PREV VISIT EST AGE 18-39: CPT | Mod: 25 | Performed by: INTERNAL MEDICINE

## 2019-11-06 PROCEDURE — 90471 IMMUNIZATION ADMIN: CPT | Performed by: INTERNAL MEDICINE

## 2019-11-06 PROCEDURE — 99213 OFFICE O/P EST LOW 20 MIN: CPT | Mod: 25 | Performed by: INTERNAL MEDICINE

## 2019-11-06 PROCEDURE — 90651 9VHPV VACCINE 2/3 DOSE IM: CPT | Performed by: INTERNAL MEDICINE

## 2019-11-06 ASSESSMENT — MIFFLIN-ST. JEOR: SCORE: 1253.64

## 2019-11-06 NOTE — PROGRESS NOTES
SUBJECTIVE:   CC: Rebecca Huerta is an 24 year old woman who presents for preventive health visit.     Working at [employer]  Lives alone.      Healthy Habits:    Do you get at least three servings of calcium containing foods daily (dairy, green leafy vegetables, etc.)? About 1 serving    Amount of exercise or daily activities, outside of work: 3 day(s) per week    Problems taking medications regularly Not applicable     Medication side effects: No    Have you had an eye exam in the past two years? no    Do you see a dentist twice per year? yes    Do you have sleep apnea, excessive snoring or daytime drowsiness?no     Rebecca continues to have headaches.  She has had them since the summer.  Mild pain in the back of her head, sometimes radiates around the sides of her head.  There is no associated nausea, photo or phonophobia.  The headache is worse with sitting and lying down, tends to get better when she gets up and walks around.  If the headache lasts for several hours, she will take Excedrin which usually resolves the headache.  She ends up needing to take this about 4 times a month.  She denies any changes to her vision.  The headache is also worse when she is anxious.  Sometimes there is associated neck and upper back/shoulder soreness along with a headache.  She also reports she hasn't been getting the best sleep recently.       She was prescribed alprazolam to take as needed at her physical last year.  She has not actually taken this yet for anxiety.  When she feels very anxious, usually walks or paces and that does help.      Today's PHQ-2 Score:   PHQ-2 ( 1999 Pfizer) 11/6/2019 11/5/2019   Q1: Little interest or pleasure in doing things 0 0   Q2: Feeling down, depressed or hopeless 0 0   PHQ-2 Score 0 0   Q1: Little interest or pleasure in doing things - Not at all   Q2: Feeling down, depressed or hopeless - Not at all   PHQ-2 Score - 0       Abuse: Current or Past(Physical, Sexual or Emotional)- No  Do you feel  safe in your environment? Yes        Social History     Tobacco Use     Smoking status: Never Smoker     Smokeless tobacco: Never Used   Substance Use Topics     Alcohol use: No     If you drink alcohol do you typically have >3 drinks per day or >7 drinks per week? No                     Reviewed orders with patient.  Reviewed health maintenance and updated orders accordingly - Yes  Patient Active Problem List   Diagnosis     Headache, unspecified headache type     Past Surgical History:   Procedure Laterality Date     NO HISTORY OF SURGERY         Social History     Tobacco Use     Smoking status: Never Smoker     Smokeless tobacco: Never Used   Substance Use Topics     Alcohol use: No     Family History   Problem Relation Age of Onset     Colon Cancer Father 64     Diabetes No family hx of      Cerebrovascular Disease No family hx of      Myocardial Infarction No family hx of      Coronary Artery Disease Early Onset No family hx of      Breast Cancer No family hx of      Ovarian Cancer No family hx of          Current Outpatient Medications   Medication Sig Dispense Refill     ALPRAZolam (XANAX) 0.25 MG tablet Take 1 tablet (0.25 mg) by mouth daily as needed for anxiety (Patient not taking: Reported on 11/6/2019) 30 tablet 0       Mammogram not appropriate for this patient based on age.    Pertinent mammograms are reviewed under the imaging tab.  History of abnormal Pap smear: n/a      Reviewed and updated as needed this visit by clinical staff  Tobacco  Allergies  Meds  Problems  Med Hx  Surg Hx  Fam Hx  Soc Hx          Reviewed and updated as needed this visit by Provider  Tobacco  Meds  Problems  Med Hx  Surg Hx  Fam Hx  Soc Hx           ROS:  CONSTITUTIONAL: NEGATIVE for fever, chills, change in weight  INTEGUMENTARU/SKIN: NEGATIVE for worrisome rashes, moles or lesions  EYES: NEGATIVE for vision changes or irritation  ENT: NEGATIVE for ear, mouth and throat problems  RESP: NEGATIVE for  "significant cough or SOB  BREAST: NEGATIVE for masses, tenderness or discharge  CV: NEGATIVE for chest pain, palpitations or peripheral edema  GI: NEGATIVE for nausea, abdominal pain, heartburn, or change in bowel habits  : NEGATIVE for unusual urinary or vaginal symptoms. Periods are regular.  MUSCULOSKELETAL: NEGATIVE for significant arthralgias or myalgia  NEURO: + headaches, NEGATIVE for weakness, dizziness or paresthesias  PSYCHIATRIC: + anxiety, denies any depressive symptoms     OBJECTIVE:   /74 (BP Location: Left arm, Patient Position: Sitting, Cuff Size: Adult Regular)   Pulse 74   Temp 96.7  F (35.9  C) (Tympanic)   Ht 1.586 m (5' 2.44\")   Wt 55.3 kg (122 lb)   LMP 10/30/2019   SpO2 100%   BMI 22.00 kg/m    EXAM:  GENERAL: healthy, alert and no distress  EYES: Eyes grossly normal to inspection, PERRL and conjunctivae and sclerae normal  HENT: ear canals and TM's normal, mouth without ulcers or lesions  NECK: no adenopathy, no asymmetry, masses, or scars and thyroid normal to palpation  RESP: lungs clear to auscultation - no rales, rhonchi or wheezes  BREAST: normal without masses, tenderness or nipple discharge and no palpable axillary masses or adenopathy  CV: regular rate and rhythm, normal S1 S2, no S3 or S4, no murmur, click or rub, no peripheral edema and peripheral pulses strong  ABDOMEN: soft, nontender, no hepatosplenomegaly, no masses and bowel sounds normal  MS: no gross musculoskeletal defects noted, no edema  SKIN: no suspicious lesions or rashes  NEURO: Normal strength and tone, mentation intact and speech normal  PSYCH: mentation appears normal, affect normal/bright        ASSESSMENT/PLAN:   1. Routine general medical examination at a health care facility  Healthy 24 year old  Pap smear declined since she has never been sexually active   She will get the flu shot at work     2. Headache, unspecified headache type  Possibly tension/migraine type headache.  The fact that they get " "better with standing is unusual. Okay to use excedrin as needed.  Referral for chiropractor and acupuncture to see if this helps with some of the neck soreness that could be contributing   - [facility] CHIROPRACTIC REFERRAL; Future    3. Need for vaccination  - HUMAN PAPILLOMA VIRUS (GARDASIL 9) VACCINE [[id number]]  - 1st  Administration  [[id number]]    COUNSELING:   Reviewed preventive health counseling, as reflected in patient instructions  Special attention given to:        Regular exercise       Healthy diet/nutrition       Immunizations          Estimated body mass index is 22 kg/m  as calculated from the following:    Height as of this encounter: 1.586 m (5' 2.44\").    Weight as of this encounter: 55.3 kg (122 lb).         reports that she has never smoked. She has never used smokeless tobacco.      Counseling Resources:  ATP IV Guidelines  Pooled Cohorts Equation Calculator  Breast Cancer Risk Calculator  FRAX Risk Assessment  ICSI Preventive Guidelines  Dietary Guidelines for Americans, 2010  [employer]'s MyPlate  ASA Prophylaxis  Lung CA Screening    Fouzia Trinidad MD  [facility] [place]  Answers for HPI/ROS submitted by the patient on 11/5/2019   Annual Exam:  Frequency of exercise:: 2-3 days/week  Getting at least 3 servings of Calcium per day:: Yes  Diet:: Regular (no restrictions)  Taking medications regularly:: Yes  Medication side effects:: Not applicable  Bi-annual eye exam:: NO  Dental care twice a year:: Yes  Sleep apnea or symptoms of sleep apnea:: Daytime drowsiness  Additional concerns today:: Yes  Duration of exercise:: 30-45 minutes    "

## 2019-11-09 ASSESSMENT — ENCOUNTER SYMPTOMS
MUSCLE CRAMPS: 1
FATIGUE: 0
PARALYSIS: 0
HALLUCINATIONS: 0
LOSS OF CONSCIOUSNESS: 0
DECREASED CONCENTRATION: 0
POLYPHAGIA: 0
JOINT SWELLING: 0
TINGLING: 1
SEIZURES: 0
NECK PAIN: 1
SPEECH CHANGE: 0
CHILLS: 0
ALTERED TEMPERATURE REGULATION: 0
STIFFNESS: 0
WEAKNESS: 0
NIGHT SWEATS: 0
DISTURBANCES IN COORDINATION: 0
NUMBNESS: 1
DIZZINESS: 0
TREMORS: 0
NERVOUS/ANXIOUS: 1
INCREASED ENERGY: 0
DEPRESSION: 0
WEIGHT LOSS: 0
MUSCLE WEAKNESS: 0
INSOMNIA: 1
POLYDIPSIA: 0
BACK PAIN: 1
PANIC: 1
MEMORY LOSS: 0
ARTHRALGIAS: 0
DECREASED APPETITE: 1
MYALGIAS: 1
FEVER: 0
WEIGHT GAIN: 0
HEADACHES: 1

## 2019-11-10 ENCOUNTER — E-VISIT (OUTPATIENT)
Dept: FAMILY MEDICINE | Facility: CLINIC | Age: 24
End: 2019-11-10
Payer: COMMERCIAL

## 2019-11-10 DIAGNOSIS — R51.9 HEADACHE, UNSPECIFIED HEADACHE TYPE: Primary | ICD-10-CM

## 2019-11-10 PROCEDURE — 99444 ZZC PHYSICIAN ONLINE EVALUATION & MANAGEMENT SERVICE: CPT | Performed by: INTERNAL MEDICINE

## 2019-11-13 ENCOUNTER — PRE VISIT (OUTPATIENT)
Dept: NEUROLOGY | Facility: CLINIC | Age: 24
End: 2019-11-13

## 2019-11-13 ENCOUNTER — HOSPITAL ENCOUNTER (EMERGENCY)
Facility: CLINIC | Age: 24
Discharge: HOME | End: 2019-11-13
Attending: EMERGENCY MEDICINE | Admitting: EMERGENCY MEDICINE
Payer: COMMERCIAL

## 2019-11-13 VITALS
TEMPERATURE: 98 F | SYSTOLIC BLOOD PRESSURE: 128 MMHG | WEIGHT: 122 LBS | OXYGEN SATURATION: 99 % | RESPIRATION RATE: 18 BRPM | BODY MASS INDEX: 22.45 KG/M2 | HEIGHT: 62 IN | HEART RATE: 105 BPM | DIASTOLIC BLOOD PRESSURE: 86 MMHG

## 2019-11-13 DIAGNOSIS — S39.012A BACK STRAIN, INITIAL ENCOUNTER: ICD-10-CM

## 2019-11-13 DIAGNOSIS — R20.2 PARESTHESIAS: ICD-10-CM

## 2019-11-13 LAB
ANION GAP SERPL CALCULATED.3IONS-SCNC: 3 MMOL/L (ref 3–14)
BUN SERPL-MCNC: 6 MG/DL (ref 7–30)
CALCIUM SERPL-MCNC: 9 MG/DL (ref 8.5–10.1)
CHLORIDE SERPL-SCNC: 101 MMOL/L (ref 94–109)
CO2 SERPL-SCNC: 31 MMOL/L (ref 20–32)
CREAT SERPL-MCNC: 0.62 MG/DL (ref 0.52–1.04)
GFR SERPL CREATININE-BSD FRML MDRD: >90 ML/MIN/{1.73_M2}
GLUCOSE SERPL-MCNC: 113 MG/DL (ref 70–99)
POTASSIUM SERPL-SCNC: 3.6 MMOL/L (ref 3.4–5.3)
SODIUM SERPL-SCNC: 135 MMOL/L (ref 133–144)

## 2019-11-13 PROCEDURE — 99283 EMERGENCY DEPT VISIT LOW MDM: CPT

## 2019-11-13 PROCEDURE — 80048 BASIC METABOLIC PNL TOTAL CA: CPT | Performed by: EMERGENCY MEDICINE

## 2019-11-13 RX ORDER — METHOCARBAMOL 500 MG/1
500 TABLET, FILM COATED ORAL 4 TIMES DAILY PRN
Qty: 20 TABLET | Refills: 0 | Status: SHIPPED | OUTPATIENT
Start: 2019-11-13 | End: 2019-12-15

## 2019-11-13 ASSESSMENT — ENCOUNTER SYMPTOMS
UNEXPECTED WEIGHT CHANGE: 0
HEADACHES: 0
NUMBNESS: 1
WEAKNESS: 0
APPETITE CHANGE: 0

## 2019-11-13 ASSESSMENT — MIFFLIN-ST. JEOR: SCORE: 1246.64

## 2019-11-13 NOTE — TELEPHONE ENCOUNTER
FUTURE VISIT INFORMATION      FUTURE VISIT INFORMATION:    Date: 11/22/2019    Time: 730AM    [place] [facility] [place]:    Referring provider:  Dr. Trinidad     Referring providers clinic:  [facility]     Reason for visit/diagnosis  Headaches and Tingling     RECORDS REQUESTED FROM:       Clinic name Comments Records Status Imaging Status    * No records in Care EVerywhere*

## 2019-11-13 NOTE — ED PROVIDER NOTES
History     Chief Complaint:  Facial Numbness    HPI   Rebecca Huerta is a 24 year old female with a history of anxiety who presents for evaluation of facial numbness. One month ago, the patient reports the onset of thoracic back pain after using a leg squat machine at her gym. This has been bothering her intermittently, however was able to control it with over the counter analgesia. In the last week, she also started feeling numbness in her jaw bilaterally. This has been intermittent, but can last for 1-2 hours at a time and increases the patient's anxiety. This morning, the patient woke up with the back pain that she went to bed with, along with new right leg pain. This new leg pain peaked the patient's anxiety and she reports she started breathing fast. Then, she started having the jaw numbness and right hand numbness. She took some Aleve which helped her leg pain, but the back pain and facial numbness were still present so she came in to the ED. Here, she continues to report jaw numbness and back discomfort. She denies any weakness in her extremities or recent diet change. This numbness is occasionally associated with the patient's typical migraine, but not always. She denies a migraine currently, nor have they increased in intensity or frequency recently. She has seen her PCP in regards to these headaches who has recommended Excedrin. She has no other concerns.     Allergies:  No Known Allergies     Medications:    Xanax    Past Medical History:    Migraines  Anxiety     Past Surgical History:    The patient does not have any pertinent past surgical history.     Family History:    Father - colon cancer    Social History:  Marital Status:  Single [1]  Negative for tobacco use.  Negative for alcohol use.     Review of Systems   Constitutional: Negative for appetite change and unexpected weight change.   Neurological: Positive for numbness. Negative for weakness and headaches.   All other systems reviewed and are  "negative.        Physical Exam     Patient Vitals for the past 24 hrs:   BP Temp Temp src Pulse Resp SpO2 Height Weight   11/13/19 0730 128/86 98  F (36.7  C) Oral 105 18 99 % 1.575 m (5' 2\") 55.3 kg (122 lb)      Physical Exam    GENERAL: well developed, pleasant  HEAD: atraumatic  EYES: pupils reactive, extraocular muscles intact, conjunctivae normal  ENT:  mucus membranes moist  NECK:  trachea midline, normal range of motion  RESPIRATORY: no tachypnea, breath sounds clear to auscultation   CVS: normal S1/S2, no murmurs, intact distal pulses  ABDOMEN: soft, nontender, nondistention  MUSCULOSKELETAL: no deformities. Mild mid t-spine pain without focal tenderness.   SKIN: warm and dry, no acute rashes or ulceration  NEURO: GCS 15, cranial nerves intact, alert and oriented x3.Normal sensation to face. Normal finger to nose. noraml gait. Able to stand on tiptoes, heels, and do a squat.   PSYCH:  Mood/affect normal    Emergency Department Course   Laboratory:  BMP: Glucose 113 (H), BUN; 6 (L), o/w WNL (Creatinine: 0.62)     Procedures:  None     Emergency Department Course:  Nursing notes and vitals reviewed.   0732: I performed an exam of the patient as documented above.      Blood was drawn for laboratory testing, results above.     0820: I rechecked the patient and discussed the results of her workup thus far.     Findings and plan explained to the Patient. Patient discharged home with instructions regarding supportive care, medications, and reasons to return. The importance of close follow-up was reviewed. She was prescribed Robaxin.     I personally reviewed the laboratory results with the Patient and answered all related questions prior to discharge.    Impression & Plan      Medical Decision Making:  Rebecca Huerta is a 24 year old female who presents with paresthesias.  She notes recent back injury to her mid back after doing what sounds to be a leg press machine.  She has had intermittent mid back pain and has " been seeing chiropractic therapy.  Over this past week she is noted intermittent numbness on both sides of her face mainly in the jawline.    Patient has a normal neurologic exam with normal sensation to the face, normal finger-to-nose, normal strength, normal gait, is able to stand on her tiptoes heels and do a squat.  Patient notes some intermittent anxiety.  She notes this morning having mid back pain with some pain went down her right leg on the lateral aspect but then subsided after taking Aleve.  He developed numbness and noticed it to her face and one hand.  Certainly anxiety attack can cause this.  Discussed with her her normal exam had normal labs.  MS is considered although felt to be unlikely given the intermittent nature and symmetric nature.  Did not feel that she needs emergent MRI given her normal exam and the likelihood of disease.  Patient is comfortable with discharge.  Patient notes that she was prescribed something for her back pain that did not seem to help and actually Aleve seems to help better.  She thinks it was Flexeril.  We will try a course of Robaxin.  She is not having vertigo or dizziness and do not think that she has a dissection given the chiropractic history.    Diagnosis:    ICD-10-CM    1. Paresthesias R20.2    2. Back strain, initial encounter S39.012A        Disposition:  discharged to home    Discharge medications:   New Prescriptions    METHOCARBAMOL (ROBAXIN) 500 MG TABLET    Take 1 tablet (500 mg) by mouth 4 times daily as needed for muscle spasms       Scribe Disclosure:  NII Carolyn Cristofer, am serving as a scribe on 11/13/2019 at 7:31 AM to personally document services performed by Oswaldo Curtis MD based on my observations and the provider's statements to me.      11/13/2019    EMERGENCY DEPARTMENT       Oswaldo Curtis MD  11/13/19 0972

## 2019-11-14 ENCOUNTER — MYC MEDICAL ADVICE (OUTPATIENT)
Dept: FAMILY MEDICINE | Facility: CLINIC | Age: 24
End: 2019-11-14

## 2019-11-14 NOTE — TELEPHONE ENCOUNTER
Please see [facility] message and advise. Thank you very much.      Kaycee Resendiz RN, BSN  [facility] [place]

## 2019-11-20 ENCOUNTER — OFFICE VISIT (OUTPATIENT)
Dept: ORTHOPEDICS | Facility: CLINIC | Age: 24
End: 2019-11-20
Payer: COMMERCIAL

## 2019-11-20 ENCOUNTER — ANCILLARY PROCEDURE (OUTPATIENT)
Dept: GENERAL RADIOLOGY | Facility: CLINIC | Age: 24
End: 2019-11-20
Attending: FAMILY MEDICINE
Payer: COMMERCIAL

## 2019-11-20 VITALS
HEIGHT: 62 IN | DIASTOLIC BLOOD PRESSURE: 66 MMHG | WEIGHT: 122 LBS | BODY MASS INDEX: 22.45 KG/M2 | SYSTOLIC BLOOD PRESSURE: 110 MMHG

## 2019-11-20 DIAGNOSIS — M62.838 MUSCLE SPASM: ICD-10-CM

## 2019-11-20 DIAGNOSIS — M54.2 NECK PAIN: ICD-10-CM

## 2019-11-20 DIAGNOSIS — M54.2 NECK PAIN: Primary | ICD-10-CM

## 2019-11-20 PROCEDURE — 72040 X-RAY EXAM NECK SPINE 2-3 VW: CPT

## 2019-11-20 PROCEDURE — 99204 OFFICE O/P NEW MOD 45 MIN: CPT | Performed by: FAMILY MEDICINE

## 2019-11-20 ASSESSMENT — MIFFLIN-ST. JEOR: SCORE: 1246.64

## 2019-11-20 NOTE — PROGRESS NOTES
ASSESSMENT & PLAN    1. Neck pain    2. Muscle spasm      Seen for neck and upper back pain, acute which started while doing a leg press.  Symptoms are slowly resolving since its onset.  Reports some transient bilateral hand numbness which has resolved and has no sensory deficits today on exam  Reviewed x-rays which show normal disc space  Benign exam and a negative x-ray recommend structured physical therapy initially  Physical therapy: [facility] - [phone]    Follow-up if not improving    -----    SUBJECTIVE  Rebecca Huerta is a/an 24 year old female who is seen as a self referral for evaluation of neck and mid back pain. The patient is seen by themselves.    Onset: 1 month(s) ago. Patient describes injury as was using a leg press machine at the gym and felt like a lot of weight was pressing on her back and neck. She reports that she was able to finish her lift but shortly after she noticed soreness in her mid back and neck. She reports pain has gradually gotten better.  Location of Pain: midline neck and midline thoracic spine  Rating of Pain at worst: 6/10  Rating of Pain Currently: 3/10  Worsened by: laying down, sitting  Better with: standing, walking, robaxin  Treatments tried: rest/activity avoidance, Aleve, other medications: Robaxin and chiropractic care (1 visits)  Quality: initially felt like soreness, burning, throbbing; now feels achy  Red flags: Weakness: No  Associated symptoms: initially felt tingling in neck and bilateral hands (patient reports that it resolved about 5 days ago)  Orthopedic history: NO  Relevant surgical history: NO  Patient Social History: works at desk job    Patient's past medical, surgical, social, and family histories were reviewed today and no pertinent history related to patient's presenting problem.    REVIEW OF SYSTEMS:  10 point ROS is negative other than symptoms noted above in HPI, Past Medical History or as stated below  Constitutional: NEGATIVE  "for fever, chills, change in weight  Skin: NEGATIVE for worrisome rashes, moles or lesions  GI/: NEGATIVE for bowel or bladder changes  Neuro: NEGATIVE for weakness, dizziness or paresthesias    OBJECTIVE:  /66   Ht 1.575 m (5' 2\")   Wt 55.3 kg (122 lb)   LMP 10/30/2019   BMI 22.31 kg/m     General: healthy, alert and in no distress  HEENT: no scleral icterus or conjunctival erythema  Skin: no suspicious lesions or rash. No jaundice.  CV: no pedal edema  Resp: normal respiratory effort without conversational dyspnea   Psych: normal mood and affect  Gait: normal steady gait with appropriate coordination and balance  Neuro: normal light touch sensory exam of the upper extremities although she had a transient episode of numbness in her bilateral hands at a discrete dermatomal distribution.    MSK:  CERVICAL SPINE  Inspection:    normal cervical lordosis present  Palpation:    Tender about the upper trapezius (bilateral) and rhomboids (bilateral). Otherwise remainder of the landmarks and nontender.  Range of Motion:     Flexion full    Extension full    Right side bend full    Left side bend full    Right rotation full    Left rotation full  Strength:    Deltoid (C5) 5/5, biceps (C6) 5/5, wrist extension (C6) 5/5, triceps (C7) 5/5, wrist flexion (C7) 5/5, finger flexion (C8) 5/5  Special Tests:    Negative: Spurling's (bilateral)    Independent visualization of the below image:  X-ray Cervical Spine  Loss of normal cervical lordosis.  Normal disc height.  No fracture or acute osseous findings.    Summer Bowles, DO [facility]    "

## 2019-11-20 NOTE — LETTER
11/20/2019         RE: Rebecca Huerta  [address]  [address]  [place] [address]        Dear Colleague,    Thank you for referring your patient, Rebecca Huerta, to the [facility]. Please see a copy of my visit note below.    ASSESSMENT & PLAN    1. Neck pain    2. Muscle spasm      Seen for neck and upper back pain, acute which started while doing a leg press.  Symptoms are slowly resolving since its onset.  Reports some transient bilateral hand numbness which has resolved and has no sensory deficits today on exam  Reviewed x-rays which show normal disc space  Benign exam and a negative x-ray recommend structured physical therapy initially  Physical therapy: [facility] - [phone]    Follow-up if not improving    -----    SUBJECTIVE  Rebecca Huerta is a/an 24 year old female who is seen as a self referral for evaluation of neck and mid back pain. The patient is seen by themselves.    Onset: 1 month(s) ago. Patient describes injury as was using a leg press machine at the gym and felt like a lot of weight was pressing on her back and neck. She reports that she was able to finish her lift but shortly after she noticed soreness in her mid back and neck. She reports pain has gradually gotten better.  Location of Pain: midline neck and midline thoracic spine  Rating of Pain at worst: 6/10  Rating of Pain Currently: 3/10  Worsened by: laying down, sitting  Better with: standing, walking, robaxin  Treatments tried: rest/activity avoidance, Aleve, other medications: Robaxin and chiropractic care (1 visits)  Quality: initially felt like soreness, burning, throbbing; now feels achy  Red flags: Weakness: No  Associated symptoms: initially felt tingling in neck and bilateral hands (patient reports that it resolved about 5 days ago)  Orthopedic history: NO  Relevant surgical history: NO  Patient Social History: works at desk job    Patient's past medical, surgical, social, and  "family histories were reviewed today and no pertinent history related to patient's presenting problem.    REVIEW OF SYSTEMS:  10 point ROS is negative other than symptoms noted above in HPI, Past Medical History or as stated below  Constitutional: NEGATIVE for fever, chills, change in weight  Skin: NEGATIVE for worrisome rashes, moles or lesions  GI/: NEGATIVE for bowel or bladder changes  Neuro: NEGATIVE for weakness, dizziness or paresthesias    OBJECTIVE:  /66   Ht 1.575 m (5' 2\")   Wt 55.3 kg (122 lb)   LMP 10/30/2019   BMI 22.31 kg/m      General: healthy, alert and in no distress  HEENT: no scleral icterus or conjunctival erythema  Skin: no suspicious lesions or rash. No jaundice.  CV: no pedal edema  Resp: normal respiratory effort without conversational dyspnea   Psych: normal mood and affect  Gait: normal steady gait with appropriate coordination and balance  Neuro: normal light touch sensory exam of the upper extremities although she had a transient episode of numbness in her bilateral hands at a discrete dermatomal distribution.    MSK:  CERVICAL SPINE  Inspection:    normal cervical lordosis present  Palpation:    Tender about the upper trapezius (bilateral) and rhomboids (bilateral). Otherwise remainder of the landmarks and nontender.  Range of Motion:     Flexion full    Extension full    Right side bend full    Left side bend full    Right rotation full    Left rotation full  Strength:    Deltoid (C5) 5/5, biceps (C6) 5/5, wrist extension (C6) 5/5, triceps (C7) 5/5, wrist flexion (C7) 5/5, finger flexion (C8) 5/5  Special Tests:    Negative: Spurling's (bilateral)    Independent visualization of the below image:  X-ray Cervical Spine  Loss of normal cervical lordosis.  Normal disc height.  No fracture or acute osseous findings.    Summer Bowles, DO [facility]      Again, thank you for allowing me to participate in the care of your patient.  "       Sincerely,        Summer Bowles DO

## 2019-11-22 ENCOUNTER — OFFICE VISIT (OUTPATIENT)
Dept: NEUROLOGY | Facility: CLINIC | Age: 24
End: 2019-11-22
Payer: COMMERCIAL

## 2019-11-22 ENCOUNTER — THERAPY VISIT (OUTPATIENT)
Dept: PHYSICAL THERAPY | Facility: CLINIC | Age: 24
End: 2019-11-22
Payer: COMMERCIAL

## 2019-11-22 VITALS
OXYGEN SATURATION: 100 % | HEART RATE: 108 BPM | DIASTOLIC BLOOD PRESSURE: 75 MMHG | BODY MASS INDEX: 22.48 KG/M2 | WEIGHT: 122.9 LBS | SYSTOLIC BLOOD PRESSURE: 119 MMHG

## 2019-11-22 DIAGNOSIS — M54.2 NECK PAIN: ICD-10-CM

## 2019-11-22 DIAGNOSIS — M62.838 MUSCLE SPASM: ICD-10-CM

## 2019-11-22 DIAGNOSIS — R20.9 SENSORY DISORDER: Primary | ICD-10-CM

## 2019-11-22 PROCEDURE — 97161 PT EVAL LOW COMPLEX 20 MIN: CPT | Mod: GP | Performed by: PHYSICAL THERAPIST

## 2019-11-22 PROCEDURE — 97110 THERAPEUTIC EXERCISES: CPT | Mod: GP | Performed by: PHYSICAL THERAPIST

## 2019-11-22 RX ORDER — NORTRIPTYLINE HCL 10 MG
10 CAPSULE ORAL AT BEDTIME
Qty: 30 CAPSULE | Refills: 1 | Status: SHIPPED | OUTPATIENT
Start: 2019-11-22 | End: 2021-09-28

## 2019-11-22 ASSESSMENT — PAIN SCALES - GENERAL: PAINLEVEL: NO PAIN (0)

## 2019-11-22 NOTE — PROGRESS NOTES
[facility] Initial Evaluation  Subjective:  The history is provided by the patient. No  was used.   Rebecca Huerta being seen for neck and upper back pain that is worse with sitting for long periods of time. .   Problem began 10/31/2019. Problem occurred: working out on a leg press machine at the gym.   and reported as 2/10 on pain scale. General health as reported by patient is good. Pertinent medical history includes:  Numbness/tingling.      Current medications:  Anti-inflammatory.   Primary job tasks include:  Prolonged sitting and computer work.  Pain is described as aching, shooting and sharp and is intermittent. Pain is worse during the night. Since onset symptoms are unchanged.      Patient is . Restrictions include:  Working in normal job without restrictions.    Barriers include:  None as reported by patient.  Red flags:  None as reported by patient.  Type of problem:  Cervical spine    This is a new condition    Patient reports pain:  Mid thoracic, upper thoracic, upper cervical spine, central cervical spine and mid cervical spine. Radiates to:  Shoulder right, shoulder left, hand left and hand right. Associated symptoms:  Headache, loss of strength, loss of motion/stiffness, numbness and tingling. Symptoms are exacerbated by lying down and change of position and relieved by NSAID's and muscle relaxants.                      Objective:  Standing Alignment:    Cervical/Thoracic:  Forward head (mild, corrects easily with cues)  Shoulder/UE:  Rounded shoulders (mild, corrects with verbal cuing)              Gait:    Gait Type:  Normal                         Cervical/Thoracic Evaluation    AROM:  AROM Cervical:    Flexion:            Chin to chest, no pain  Extension:       WNL, no pain  Rotation:         Left: Full, no pain     Right: Full, no pain  Side Bend:      Left: WNL, no pain     Right:  WNL, no pain  AROM Thoracic:    Flexion:             Full, no  pain  Extension:          50%, painful  Rotation:            Left:     Right:      Headaches: cervical  Cervical Myotomes:    C1-2 (Neck Flex): Left:  5    Right: 5  C3 (neck side bend): Left: 5    Right: 5  C4 (shrug):  Left: 5    Right: 5  C5 (Deltoid):  Left: 5    Right: 5  C6 (Biceps):  Left: 5    Right: 5  C7 (Triceps):  Left: 5    Right: 5  C8 (Thumb Ext): Left: 5    Right: 5  T1 (Intrinsics): Left: 5    Right: 5  DTR's:  not assessed          Cervical Dermatomes:  Cervical dermatomes: Pt has normal light touch. She denies tingling, numbness, and burning sensations into bilateral UE and LEs at time of evaluation.                    Cervical Palpation:  : Mild tenderness to palpation at the sites.  Tenderness present at Left:    Sternocleidomstoid; Upper Trap; Levator; Erector Spinae and Facet  Tenderness not present at Left:   Suboccipitals  Tenderness present at Right:    Sternocleidomstoid; Upper Trap; Levator; Erector Spinae and Facet  Tenderness not present at Right:      Suboccipitals      Spinal Segmental Conclusions:  Prone spring testing: Pain with light mobilization from T8-C7.        Cord Sign:  not assessed                                            General     ROS    Assessment/Plan:    Patient is a 24 year old female with cervical and thoracic complaints.    Patient has the following significant findings with corresponding treatment plan.                Diagnosis 1:  Neck and thoracic pain secondary to poor posture  Pain -  hot/cold therapy, US, electric stimulation, mechanical traction, manual therapy, self management, education, directional preference exercise and home program  Decreased ROM/flexibility - manual therapy, therapeutic exercise and home program  Decreased joint mobility - manual therapy, therapeutic exercise and home program  Decreased strength - therapeutic exercise, therapeutic activities and home program  Impaired muscle performance - neuro re-education and home  program  Decreased function - therapeutic activities and home program  Impaired posture - neuro re-education and home program    Therapy Evaluation Codes:   1) History comprised of:   Personal factors that impact the plan of care:      None.    Comorbidity factors that impact the plan of care are:      None.     Medications impacting care: None.  2) Examination of Body Systems comprised of:   Body structures and functions that impact the plan of care:      Cervical spine and Thoracic Spine.   Activity limitations that impact the plan of care are:      Driving, Dressing, Reading/Computer work, Sitting, Standing, Walking, Working, Sleeping and Laying down.  3) Clinical presentation characteristics are:   Stable/Uncomplicated.  4) Decision-Making    Low complexity using standardized patient assessment instrument and/or measureable assessment of functional outcome.  Cumulative Therapy Evaluation is: Low complexity.    Previous and current functional limitations:  (See Goal Flow Sheet for this information)    Short term and Long term goals: (See Goal Flow Sheet for this information)     Communication ability:  Patient appears to be able to clearly communicate and understand verbal and written communication and follow directions correctly.  Treatment Explanation - The following has been discussed with the patient:   RX ordered/plan of care  Anticipated outcomes  Possible risks and side effects  This patient would benefit from PT intervention to resume normal activities.   Rehab potential is excellent.    Frequency:  1 X week, once daily  Duration:  for 6 weeks  Discharge Plan:  Achieve all LTG.  Independent in home treatment program.  Reach maximal therapeutic benefit.    Please refer to the daily flowsheet for treatment today, total treatment time and time spent performing 1:1 timed codes.

## 2019-11-22 NOTE — LETTER
RE: Rebecca Huerta  [address]  [address]  [place] [address]     Dear Colleague,    Thank you for referring your patient, Rebecca Huerta, to the [facility] NEUROLOGY at [facility] Please see a copy of my visit note below.    Service Date: 11/22/2019      REASON FOR VISIT:  This patient is a 24-year-old right-handed woman seen for evaluation of neck pain, sensory disturbance in the arms and legs and headache.        HISTORY OF PRESENT ILLNESS:  She has a somewhat complicated history.  In October, she began to use a weight machine for her exercise.  She thinks that she was pushing more weight than she should have been.  She did this off and on for a couple of weeks and was feeling pressure in her low back.  She persisted in the exercise despite this.  The pressure in the back then evolved into a burning into the neck and mid back.  She said the symptoms were graded at about a 6 on a scale of 10, but she was able to work.  The symptoms were intermittent.  She noticed it mostly with lying down.  She was prescribed Aleve and methocarbamol.  The burning sensation went away.  However, then about 2 weeks ago, she developed a tingling sensation in the arms and legs.  This resolved after a couple of days.  She also had a radiating pain without numbness and tingling into the ribcage below her breast line.  Now the symptom is mostly one of soreness and aching.  She feels better when she walks and lying down is the worst.  She no longer has symptoms in the arms or legs such as numbness, tingling or sensory disturbance.  She does have a history of anxiety.  She has no problem walking or climbing stairs.  She has no issues with bowel or bladder control.  She still has the discomfort in the cervical and thoracic region.  She also has headaches that are occipital and also on the top of her head.  She has a history of migraine headache for which she takes Excedrin.  She would  describe these as more of a tension headache.  She is having problems sleeping because it is uncomfortable for her to lie on her back.  She does get a tingling sensation in the arms, hands and legs and this was present mainly for a few days as noted.  Her sleep is interrupted.  She has had issues with anxiety for the last couple of years and her anxiety gets worse with the symptom escalation.  Her diet is good.      PAST MEDICAL HISTORY:  Largely noncontributory.  She does not have high blood pressure, diabetes, thyroid or asthma.  She does continue to take the methocarbamol, but would like to get off it.  She has not had pertinent surgery or trauma to the head or neck.  She does not drink or smoke.  She is not pregnant.      SOCIAL HISTORY:  She is unmarried without children.  She works as a .      FAMILY HISTORY:  Positive for colon cancer in her father.  She has taken melatonin without much benefit.      PHYSICAL EXAMINATION:   GENERAL:  The patient is cooperative and in no distress.   VITAL SIGNS:  Her blood pressure is 119/75.  There are no carotid bruits.  Auscultation of the heart shows S1 and S2.     She has fairly good cervical range of motion.  There is no Lhermitte sign.  She does not have specific point tenderness in the suboccipital or occipital region.     NEUROLOGIC:  The patient is alert, oriented and lucid.  Cranial nerve testing shows full visual fields to confrontation.  Funduscopic exam shows sharp discs bilaterally.  Visual acuity 20/20 bilaterally.  Eye movements are complete and conjugate without nystagmus.  Pupils react sluggishly to light.  Facial sensation is normal.  Face moves symmetrically.  Palate elevates in the midline and tongue protrudes in the midline.  Motor evaluation shows no pronator drift, normal finger tapping, finger-nose-finger and heel-knee-shin.  The patient has good strength in the arms, hands and legs.  Muscle stretch reflexes are absent in the arms, brisk  at the knees and reduced at the ankles.  Toes are downgoing.  Sensory exam shows preserved vibration, temperature, pinprick and fairly well preserved graphesthesia in the hands.  Romberg sign is absent.  She can walk on her heels, toes and tandem.      ASSESSMENT:   1.  Probable muscle tension headache.   2.  Cervicalgia with transient 4 extremity sensory disturbance.      DISCUSSION:  The patient is seen for evaluation of issues of headache and also cervicalgia with pain in the thoracic spine and transient 4 extremity sensory disturbance a few weeks ago.  Her exam at this time is normal.  I reassured the patient on this.  I suspect the headaches are tension headache syndrome.  She is interested in a trial of medication for this so I am putting her on nortriptyline 10 mg at night to see if that can help improve her sleep and relieve the headache.  I suggested she try and discontinue the methocarbamol.      With regard to the 4 extremity sensory disturbance, she is quite concerned that this could represent pressure on the spinal cord.  That is possible given the exercise regimen she was in.  I am going to obtain an MRI of the cervical spine to rule out any structural lesion.  I will see her in followup in the next few weeks for reexamination.      Daryl Yo MD      cc:   Fouzia Trinidad MD   [place]    [address] [place] [address] [place] [address]      D: 2019   T: 2019   MT: AKA      Name:     GAUDENCIO SALAZAR   MRN:      [id number]        Account:      [id number]   :      1995           Service Date: 2019      Document: [id number]

## 2019-11-22 NOTE — PROGRESS NOTES
Service Date: 11/22/2019      REASON FOR VISIT:  This patient is a 24-year-old right-handed woman seen for evaluation of neck pain, sensory disturbance in the arms and legs and headache.        HISTORY OF PRESENT ILLNESS:  She has a somewhat complicated history.  In October, she began to use a weight machine for her exercise.  She thinks that she was pushing more weight than she should have been.  She did this off and on for a couple of weeks and was feeling pressure in her low back.  She persisted in the exercise despite this.  The pressure in the back then evolved into a burning into the neck and mid back.  She said the symptoms were graded at about a 6 on a scale of 10, but she was able to work.  The symptoms were intermittent.  She noticed it mostly with lying down.  She was prescribed Aleve and methocarbamol.  The burning sensation went away.  However, then about 2 weeks ago, she developed a tingling sensation in the arms and legs.  This resolved after a couple of days.  She also had a radiating pain without numbness and tingling into the ribcage below her breast line.  Now the symptom is mostly one of soreness and aching.  She feels better when she walks and lying down is the worst.  She no longer has symptoms in the arms or legs such as numbness, tingling or sensory disturbance.  She does have a history of anxiety.  She has no problem walking or climbing stairs.  She has no issues with bowel or bladder control.  She still has the discomfort in the cervical and thoracic region.  She also has headaches that are occipital and also on the top of her head.  She has a history of migraine headache for which she takes Excedrin.  She would describe these as more of a tension headache.  She is having problems sleeping because it is uncomfortable for her to lie on her back.  She does get a tingling sensation in the arms, hands and legs and this was present mainly for a few days as noted.  Her sleep is interrupted.  She  has had issues with anxiety for the last couple of years and her anxiety gets worse with the symptom escalation.  Her diet is good.      PAST MEDICAL HISTORY:  Largely noncontributory.  She does not have high blood pressure, diabetes, thyroid or asthma.  She does continue to take the methocarbamol, but would like to get off it.  She has not had pertinent surgery or trauma to the head or neck.  She does not drink or smoke.  She is not pregnant.      SOCIAL HISTORY:  She is unmarried without children.  She works as a .      FAMILY HISTORY:  Positive for colon cancer in her father.  She has taken melatonin without much benefit.      PHYSICAL EXAMINATION:   GENERAL:  The patient is cooperative and in no distress.   VITAL SIGNS:  Her blood pressure is 119/75.  There are no carotid bruits.  Auscultation of the heart shows S1 and S2.     She has fairly good cervical range of motion.  There is no Lhermitte sign.  She does not have specific point tenderness in the suboccipital or occipital region.     NEUROLOGIC:  The patient is alert, oriented and lucid.  Cranial nerve testing shows full visual fields to confrontation.  Funduscopic exam shows sharp discs bilaterally.  Visual acuity 20/20 bilaterally.  Eye movements are complete and conjugate without nystagmus.  Pupils react sluggishly to light.  Facial sensation is normal.  Face moves symmetrically.  Palate elevates in the midline and tongue protrudes in the midline.  Motor evaluation shows no pronator drift, normal finger tapping, finger-nose-finger and heel-knee-shin.  The patient has good strength in the arms, hands and legs.  Muscle stretch reflexes are absent in the arms, brisk at the knees and reduced at the ankles.  Toes are downgoing.  Sensory exam shows preserved vibration, temperature, pinprick and fairly well preserved graphesthesia in the hands.  Romberg sign is absent.  She can walk on her heels, toes and tandem.      ASSESSMENT:   1.  Probable  muscle tension headache.   2.  Cervicalgia with transient 4 extremity sensory disturbance.      DISCUSSION:  The patient is seen for evaluation of issues of headache and also cervicalgia with pain in the thoracic spine and transient 4 extremity sensory disturbance a few weeks ago.  Her exam at this time is normal.  I reassured the patient on this.  I suspect the headaches are tension headache syndrome.  She is interested in a trial of medication for this so I am putting her on nortriptyline 10 mg at night to see if that can help improve her sleep and relieve the headache.  I suggested she try and discontinue the methocarbamol.      With regard to the 4 extremity sensory disturbance, she is quite concerned that this could represent pressure on the spinal cord.  That is possible given the exercise regimen she was in.  I am going to obtain an MRI of the cervical spine to rule out any structural lesion.  I will see her in followup in the next few weeks for reexamination.      Solis Yo MD      cc:   Fouzia Trinidad MD   [facility]    [address] [place] [address] [place] [address]         SOLIS YO MD             D: 2019   T: 2019   MT: AKA      Name:     GAUDENCIO SALAZAR   MRN:      [id number]        Account:      [id number]   :      1995           Service Date: 2019      Document: [id number]        12/3 addendum: reviewed unremarkable MRI cervical with pt.

## 2019-12-02 ENCOUNTER — ANCILLARY PROCEDURE (OUTPATIENT)
Dept: MRI IMAGING | Facility: CLINIC | Age: 24
End: 2019-12-02
Attending: PSYCHIATRY & NEUROLOGY
Payer: COMMERCIAL

## 2019-12-02 DIAGNOSIS — R20.9 SENSORY DISORDER: ICD-10-CM

## 2019-12-02 RX ORDER — GADOBUTROL 604.72 MG/ML
7.5 INJECTION INTRAVENOUS ONCE
Status: ACTIVE | OUTPATIENT
Start: 2019-12-02

## 2019-12-02 RX ORDER — GADOBUTROL 604.72 MG/ML
7.5 INJECTION INTRAVENOUS ONCE
Status: COMPLETED | OUTPATIENT
Start: 2019-12-02 | End: 2019-12-02

## 2019-12-02 RX ADMIN — GADOBUTROL 6 ML: 604.72 INJECTION INTRAVENOUS at 19:42

## 2019-12-15 ENCOUNTER — APPOINTMENT (OUTPATIENT)
Dept: GENERAL RADIOLOGY | Facility: CLINIC | Age: 24
End: 2019-12-15
Attending: EMERGENCY MEDICINE
Payer: COMMERCIAL

## 2019-12-15 ENCOUNTER — HOSPITAL ENCOUNTER (EMERGENCY)
Facility: CLINIC | Age: 24
Discharge: HOME | End: 2019-12-15
Attending: EMERGENCY MEDICINE | Admitting: EMERGENCY MEDICINE
Payer: COMMERCIAL

## 2019-12-15 VITALS
SYSTOLIC BLOOD PRESSURE: 122 MMHG | OXYGEN SATURATION: 98 % | RESPIRATION RATE: 11 BRPM | BODY MASS INDEX: 21.62 KG/M2 | DIASTOLIC BLOOD PRESSURE: 78 MMHG | TEMPERATURE: 99 F | WEIGHT: 122 LBS | HEIGHT: 63 IN | HEART RATE: 99 BPM

## 2019-12-15 DIAGNOSIS — G43.809 OTHER MIGRAINE WITHOUT STATUS MIGRAINOSUS, NOT INTRACTABLE: ICD-10-CM

## 2019-12-15 DIAGNOSIS — R06.02 SHORTNESS OF BREATH: ICD-10-CM

## 2019-12-15 DIAGNOSIS — F41.1 ANXIETY REACTION: ICD-10-CM

## 2019-12-15 LAB
ALBUMIN SERPL-MCNC: 3.9 G/DL (ref 3.4–5)
ALBUMIN UR-MCNC: NEGATIVE MG/DL
ALP SERPL-CCNC: 53 U/L (ref 40–150)
ALT SERPL W P-5'-P-CCNC: 16 U/L (ref 0–50)
ANION GAP SERPL CALCULATED.3IONS-SCNC: 5 MMOL/L (ref 3–14)
APPEARANCE UR: CLEAR
AST SERPL W P-5'-P-CCNC: 16 U/L (ref 0–45)
BACTERIA #/AREA URNS HPF: ABNORMAL /HPF
BASOPHILS # BLD AUTO: 0 10E9/L (ref 0–0.2)
BASOPHILS NFR BLD AUTO: 0.2 %
BILIRUB SERPL-MCNC: 0.4 MG/DL (ref 0.2–1.3)
BILIRUB UR QL STRIP: NEGATIVE
BUN SERPL-MCNC: 11 MG/DL (ref 7–30)
CALCIUM SERPL-MCNC: 8.6 MG/DL (ref 8.5–10.1)
CHLORIDE SERPL-SCNC: 104 MMOL/L (ref 94–109)
CO2 SERPL-SCNC: 30 MMOL/L (ref 20–32)
COLOR UR AUTO: ABNORMAL
CREAT SERPL-MCNC: 0.65 MG/DL (ref 0.52–1.04)
D DIMER PPP FEU-MCNC: 0.3 UG/ML FEU (ref 0–0.5)
DIFFERENTIAL METHOD BLD: NORMAL
EOSINOPHIL # BLD AUTO: 0 10E9/L (ref 0–0.7)
EOSINOPHIL NFR BLD AUTO: 0.2 %
ERYTHROCYTE [DISTWIDTH] IN BLOOD BY AUTOMATED COUNT: 12.6 % (ref 10–15)
GFR SERPL CREATININE-BSD FRML MDRD: >90 ML/MIN/{1.73_M2}
GLUCOSE SERPL-MCNC: 142 MG/DL (ref 70–99)
GLUCOSE UR STRIP-MCNC: NEGATIVE MG/DL
HCG UR QL: NEGATIVE
HCT VFR BLD AUTO: 38.7 % (ref 35–47)
HGB BLD-MCNC: 12.7 G/DL (ref 11.7–15.7)
HGB UR QL STRIP: ABNORMAL
IMM GRANULOCYTES # BLD: 0 10E9/L (ref 0–0.4)
IMM GRANULOCYTES NFR BLD: 0.2 %
INTERPRETATION ECG - MUSE: NORMAL
KETONES UR STRIP-MCNC: NEGATIVE MG/DL
LEUKOCYTE ESTERASE UR QL STRIP: ABNORMAL
LYMPHOCYTES # BLD AUTO: 1.3 10E9/L (ref 0.8–5.3)
LYMPHOCYTES NFR BLD AUTO: 25.7 %
MCH RBC QN AUTO: 28.7 PG (ref 26.5–33)
MCHC RBC AUTO-ENTMCNC: 32.8 G/DL (ref 31.5–36.5)
MCV RBC AUTO: 88 FL (ref 78–100)
MONOCYTES # BLD AUTO: 0.6 10E9/L (ref 0–1.3)
MONOCYTES NFR BLD AUTO: 11.9 %
MUCOUS THREADS #/AREA URNS LPF: PRESENT /LPF
NEUTROPHILS # BLD AUTO: 3.2 10E9/L (ref 1.6–8.3)
NEUTROPHILS NFR BLD AUTO: 61.8 %
NITRATE UR QL: NEGATIVE
NRBC # BLD AUTO: 0 10*3/UL
NRBC BLD AUTO-RTO: 0 /100
NT-PROBNP SERPL-MCNC: 10 PG/ML (ref 0–450)
PH UR STRIP: 6.5 PH (ref 5–7)
PLATELET # BLD AUTO: 192 10E9/L (ref 150–450)
POTASSIUM SERPL-SCNC: 3.5 MMOL/L (ref 3.4–5.3)
PROT SERPL-MCNC: 7.7 G/DL (ref 6.8–8.8)
RBC # BLD AUTO: 4.42 10E12/L (ref 3.8–5.2)
RBC #/AREA URNS AUTO: <1 /HPF (ref 0–2)
SODIUM SERPL-SCNC: 139 MMOL/L (ref 133–144)
SOURCE: ABNORMAL
SP GR UR STRIP: 1 (ref 1–1.03)
SQUAMOUS #/AREA URNS AUTO: <1 /HPF (ref 0–1)
TROPONIN I SERPL-MCNC: <0.015 UG/L (ref 0–0.04)
TSH SERPL DL<=0.005 MIU/L-ACNC: 2.56 MU/L (ref 0.4–4)
UROBILINOGEN UR STRIP-MCNC: NORMAL MG/DL (ref 0–2)
WBC # BLD AUTO: 5.1 10E9/L (ref 4–11)
WBC #/AREA URNS AUTO: <1 /HPF (ref 0–5)

## 2019-12-15 PROCEDURE — 84484 ASSAY OF TROPONIN QUANT: CPT | Performed by: EMERGENCY MEDICINE

## 2019-12-15 PROCEDURE — 71046 X-RAY EXAM CHEST 2 VIEWS: CPT

## 2019-12-15 PROCEDURE — 99285 EMERGENCY DEPT VISIT HI MDM: CPT | Mod: 25

## 2019-12-15 PROCEDURE — 81025 URINE PREGNANCY TEST: CPT | Performed by: EMERGENCY MEDICINE

## 2019-12-15 PROCEDURE — 83880 ASSAY OF NATRIURETIC PEPTIDE: CPT | Performed by: EMERGENCY MEDICINE

## 2019-12-15 PROCEDURE — 81001 URINALYSIS AUTO W/SCOPE: CPT | Performed by: EMERGENCY MEDICINE

## 2019-12-15 PROCEDURE — 84443 ASSAY THYROID STIM HORMONE: CPT | Performed by: EMERGENCY MEDICINE

## 2019-12-15 PROCEDURE — 85025 COMPLETE CBC W/AUTO DIFF WBC: CPT | Performed by: EMERGENCY MEDICINE

## 2019-12-15 PROCEDURE — 96360 HYDRATION IV INFUSION INIT: CPT

## 2019-12-15 PROCEDURE — 93005 ELECTROCARDIOGRAM TRACING: CPT

## 2019-12-15 PROCEDURE — 85379 FIBRIN DEGRADATION QUANT: CPT | Performed by: EMERGENCY MEDICINE

## 2019-12-15 PROCEDURE — 25800030 ZZH RX IP 258 OP 636: Performed by: EMERGENCY MEDICINE

## 2019-12-15 PROCEDURE — 80053 COMPREHEN METABOLIC PANEL: CPT | Performed by: EMERGENCY MEDICINE

## 2019-12-15 RX ORDER — SODIUM CHLORIDE 9 MG/ML
1000 INJECTION, SOLUTION INTRAVENOUS CONTINUOUS
Status: DISCONTINUED | OUTPATIENT
Start: 2019-12-15 | End: 2019-12-15 | Stop reason: HOSPADM

## 2019-12-15 RX ADMIN — SODIUM CHLORIDE 1000 ML: 9 INJECTION, SOLUTION INTRAVENOUS at 17:32

## 2019-12-15 ASSESSMENT — ENCOUNTER SYMPTOMS
FEVER: 0
HEADACHES: 0
DIARRHEA: 0
ABDOMINAL PAIN: 0
COUGH: 0
SHORTNESS OF BREATH: 1
NAUSEA: 0
VOMITING: 0
PALPITATIONS: 1
FATIGUE: 1

## 2019-12-15 ASSESSMENT — MIFFLIN-ST. JEOR: SCORE: 1262.52

## 2019-12-15 NOTE — ED PROVIDER NOTES
History   Chief Complaint:  Shortness of Breath and Cold Symptoms    HPI   Rebecca Huerta is a 24 year old female who presents to the ED for evaluation of shortness of breath and cold symptoms. The patient reports having shortness of breath for the past two days and increased migraines for the past two weeks. She states her migraines have been different as they are more painful, she feels more fatigued, and her jaw feels tight. The patient reports she went into urgent care yesterday for her migraine and shortness of breath, but her symptoms were not resolved. The shortness of breath randomly comes on after a migraine, but nothing will exacerbates it. The patient states she can feel her heart rate going fast, but not all the time. She denies any fever, current headache, cough, nausea, vomiting, chest pain, abdominal pain, diarrhea, leg swelling, or any other acute symptoms.       CARDIAC RISK FACTORS:  Sex:    Female  Tobacco:   No  Hypertension:   No  Hyperlipidemia:  No  Diabetes:   No  Family History:  No    PE/DVT RISK FACTORS:  Sex:    Female  Hormones:   No  Tobacco:   No  Cancer:   No  Travel:   No  Surgery:   No  Other immobilization: No  Personal history:  No  Family history:  No    Allergies:  No known drug allergies    Medications:    Pamelor    Past Medical History:    Anxiety    Past Surgical History:    History reviewed. No pertinent surgical history.    Family History:    Colon cancer    Social History:  Smoking status: Never  Alcohol use: No  Drug use: No  PCP: Acacia Lipscomb  Marital Status:  Single [1]    Review of Systems   Constitutional: Positive for fatigue. Negative for fever.   Respiratory: Positive for shortness of breath. Negative for cough.    Cardiovascular: Positive for palpitations. Negative for chest pain and leg swelling.   Gastrointestinal: Negative for abdominal pain, diarrhea, nausea and vomiting.   Neurological: Negative for headaches.   All other systems reviewed and are  "negative.    Physical Exam     Patient Vitals for the past 24 hrs:   BP Temp Temp src Pulse Heart Rate Resp SpO2 Height Weight   12/15/19 1745 -- -- -- -- 105 17 99 % -- --   12/15/19 1730 129/82 -- -- 102 100 15 100 % -- --   12/15/19 1715 -- -- -- -- 112 22 100 % -- --   12/15/19 1706 -- -- -- -- 101 26 100 % -- --   12/15/19 1640 (!) 141/85 99  F (37.2  C) Oral 129 -- 18 100 % 1.6 m (5' 3\") 55.3 kg (122 lb)     Physical Exam  Nursing note and vitals reviewed.  Constitutional:  Oriented to person, place, and time. Cooperative.   HENT:   Nose:    Nose normal.   Mouth/Throat:   Mucous membranes are normal.   Eyes:    Conjunctivae normal and EOM are normal.      Pupils are equal, round, and reactive to light.   Neck:    Trachea normal.   Cardiovascular:  Tachycardic rate, regular rhythm, normal heart sounds and normal pulses. No murmur heard.  Pulmonary/Chest:  Effort normal and breath sounds normal.   Abdominal:   Soft. Normal appearance and bowel sounds are normal.      There is no tenderness.      There is no rebound and no CVA tenderness.   Musculoskeletal:  Extremities atraumatic x 4.   Lymphadenopathy:  No cervical adenopathy.   Neurological:   Alert and oriented to person, place, and time. Normal strength.      No cranial nerve deficit or sensory deficit. GCS eye subscore is 4. GCS verbal subscore is 5. GCS motor subscore is 6.   Skin:    Skin is intact. No rash noted.   Psychiatric:   Slightly anxious and tearful, but otherwise normal mood and affect.      Emergency Department Course   ECG (16:59:23):  Rate 124 bpm. AK interval 128. QRS duration 78. QT/QTc 422/606. P-R-T axes 61 76 59. Sinus tachycardia. T wave abnormality, consider inferior ischemia. Abnormal ECG. Interpreted at 1705 by Michael Baca MD.    Imaging:  Radiographic findings were communicated with the patient who voiced understanding of the findings.    XR Chest, 2 views:  Negative, the lungs appear clear.        Imaging independently " reviewed and agree with radiologist interpretation.    Laboratory:  CBC: WNL (WBC 5.1, HGB 12.7, )    CMP:  (H), o/w WNL (Creatinine 0.65)    Troponin: <0.015    D-dimer: <0.3    TSH with free T4 reflex: 2.56    BNP: 10    UA: Specific Gravity 1.002 (L), Blood Trace, Leukocyte Esterase Small, Bacteria Few, Mucous Present, o/w Negative    HCG Qualitative: Negative     Interventions:  1732: NS 1L IV Bolus     Emergency Department Course:  Past medical records, nursing notes, and vitals reviewed.  1653: I performed an exam of the patient and obtained history, as documented above.  IV inserted and blood drawn.  The patient was sent for a chest x-ray while in the emergency department, findings above.    1847: I rechecked the patient. Explained findings to patient.    Findings and plan explained to the Patient. Patient discharged home with instructions regarding supportive care, medications, and reasons to return. The importance of close follow-up was reviewed.     Impression & Plan    Medical Decision Making:  This is a 24-year-old female came in for shortness of breath.  She also apparently has been having more frequent and slightly different migraine headaches over the last couple of weeks.  She definitely seemed somewhat anxious here, and at times was tearful.  I was concerned about the possibility of pulmonary embolism though, as well as pneumothorax, asthma, cardiac ischemia, cardiac arrhythmia, or pulmonary edema.  I proceeded with the above work-up, and with a negative d-dimer, I feel that pulmonary embolism has been sufficiently ruled out.  I obtained a chest x-ray as well, which was unremarkable.  In fact, her entire work-up here is unremarkable other than her initial EKG which showed sinus tachycardia.  Her heart rate improved after being here for a while though as well.  At this point I feel comfortable discharging her home.  I stressed the importance of close outpatient follow-up though and  certainly return with any concerns or worsening symptoms.    Diagnosis:    ICD-10-CM    1. Shortness of breath R06.02    2. Other migraines without status migrainosus, not intractable G43.809    3. Anxiety reaction F41.1        Disposition:  Discharged to home.    Vu Rodriguez  12/15/2019    EMERGENCY DEPARTMENT  Scribe Disclosure:  I, Vu Rodriguez, am serving as a scribe at 4:53 PM on 12/15/2019 to document services personally performed by Michael Baca MD based on my observations and the provider's statements to me.         Michael Baca MD  12/15/19 7212

## 2019-12-15 NOTE — ED AVS SNAPSHOT
Emergency Department  [address] [place] [address]  Phone:  [phone]  Fax:  [phone]                                    Rebecca Huerta   MRN: [id number]    Department:   Emergency Department   Date of Visit:  12/15/2019           After Visit Summary Signature Page    I have received my discharge instructions, and my questions have been answered. I have discussed any challenges I see with this plan with the nurse or doctor.    ..........................................................................................................................................  Patient/Patient Representative Signature      ..........................................................................................................................................  Patient Representative Print Name and Relationship to Patient    ..................................................               ................................................  Date                                   Time    ..........................................................................................................................................  Reviewed by Signature/Title    ...................................................              ..............................................  Date                                               Time          22EPIC Rev 08/18

## 2019-12-23 ENCOUNTER — MYC MEDICAL ADVICE (OUTPATIENT)
Dept: INTERNAL MEDICINE | Facility: CLINIC | Age: 24
End: 2019-12-23

## 2019-12-23 NOTE — TELEPHONE ENCOUNTER
PCP please see [employer] message regarding recurrent headaches and seeking provider recommendation.     FYI, Last seen Dr. Trinidad on 11/06/2019 for routine px

## 2019-12-24 NOTE — TELEPHONE ENCOUNTER
Have not seen patient since October, 2018.    It looks like she has been seeing Dr. Trinidad for primary care recently. Would recommend that she discuss concerns with Dr. Trinidad or schedule an OV with me to discuss further. Do not recommend EV at this time as I will be out of office until next week.     (note to care team: please do not cut and paste above when communicating with patient; above is for care team communication only. Thank you!)

## 2020-01-04 ENCOUNTER — OFFICE VISIT (OUTPATIENT)
Dept: NEUROLOGY | Facility: CLINIC | Age: 25
End: 2020-01-04
Payer: COMMERCIAL

## 2020-01-04 VITALS
SYSTOLIC BLOOD PRESSURE: 120 MMHG | BODY MASS INDEX: 22.57 KG/M2 | DIASTOLIC BLOOD PRESSURE: 80 MMHG | OXYGEN SATURATION: 96 % | WEIGHT: 127.4 LBS | HEART RATE: 90 BPM

## 2020-01-04 DIAGNOSIS — G44.209 TENSION HEADACHE: Primary | ICD-10-CM

## 2020-01-04 ASSESSMENT — ENCOUNTER SYMPTOMS
DIZZINESS: 0
TREMORS: 0
NUMBNESS: 0
HYPOTENSION: 0
MEMORY LOSS: 0
NECK PAIN: 0
BACK PAIN: 0
ORTHOPNEA: 0
TINGLING: 0
SPEECH CHANGE: 0
LIGHT-HEADEDNESS: 0
JOINT SWELLING: 0
LOSS OF CONSCIOUSNESS: 0
ARTHRALGIAS: 0
DISTURBANCES IN COORDINATION: 0
SEIZURES: 0
MUSCLE CRAMPS: 1
HEADACHES: 1
SYNCOPE: 0
MYALGIAS: 1
MUSCLE WEAKNESS: 0
EXERCISE INTOLERANCE: 0
HYPERTENSION: 0
WEAKNESS: 0
LEG PAIN: 0
SLEEP DISTURBANCES DUE TO BREATHING: 0
PARALYSIS: 0
PALPITATIONS: 1
STIFFNESS: 0

## 2020-01-04 ASSESSMENT — PAIN SCALES - GENERAL: PAINLEVEL: NO PAIN (0)

## 2020-01-04 NOTE — LETTER
RE: Rebecca Huerta  [address]  [address]  [place] [address]     Dear Colleague,    Thank you for referring your patient, Rebecca Huerta, to the [facility] NEUROLOGY at [facility]. Please see a copy of my visit note below.    Service Date: 01/04/2020      INTERVAL HISTORY:  This patient is seen in followup with issues of headache.  I had initially seen her for sensory disturbance.  That was about 6 weeks ago.  She did have an MRI of the cervical spine.  I reviewed those images with her today.  The study is normal.  I had tried her on nortriptyline at that time to see if it could help the headache, but she did not find it helpful.  The symptoms of sensory disturbance have pretty much resolved.  She did have an emergency room visit for the headache.  This was a different type of headache.  The emergency room visit occurred about 3 weeks ago.  The headache was more intense than usual.  It was occipital and also bilateral.  She describes it as a sharp pain in the back of her head.  With another headache she had some right-sided numbness and weakness.  She took Excedrin but it did not do much.  The headache lasted about 12 hours.  It was not associated with sensitivity to noise or lights and there was no nausea.  She was able to function during the headache, so was not incapacitated by it.      PHYSICAL EXAMINATION:   GENERAL:  The patient is cooperative and in no distress.   VITAL SIGNS:  Her blood pressure is 120/80.   NEUROLOGIC:  Visual acuity 20/20 bilaterally.  Funduscopic exam shows sharp discs bilaterally.  Finger-nose-finger and heel-knee-shin are normal.  She easily performs a tandem gait.      ASSESSMENT:   1.  Headache disorder, not otherwise classified.   2.  Sensory disturbance, resolved.      DISCUSSION:  The patient is seen with the above problem list.  The new issue is the headache.  She has 2 types of headache.  One is a sharp occipital type head pain  that occurs sporadically and the other is a headache that on one occasion was associated with a right-sided sensory disturbance.  Her exam on these points is normal.  She does not have a history of migraine and this history is suggestive in that regard, but not definitive.  She does not have sensitivity to noise or lights or other associated type symptoms.  Her exam is normal.  I am going to obtain an MRI scan of the brain to make sure there is no structural lesion that would explain this new onset of headache since I saw her in November.  I will communicate the results of the tests to her when available.  Followup will be on an as as-needed basis.      Daryl Yo MD      D: 2020   T: 2020   MT: AKA      Name:     GAUDENCIO SALAZAR   MRN:      [id number]        Account:      [id number]   :      1995           Service Date: 2020      Document: [id number]

## 2020-01-04 NOTE — PROGRESS NOTES
Service Date: 01/04/2020      INTERVAL HISTORY:  This patient is seen in followup with issues of headache.  I had initially seen her for sensory disturbance.  That was about 6 weeks ago.  She did have an MRI of the cervical spine.  I reviewed those images with her today.  The study is normal.  I had tried her on nortriptyline at that time to see if it could help the headache, but she did not find it helpful.  The symptoms of sensory disturbance have pretty much resolved.  She did have an emergency room visit for the headache.  This was a different type of headache.  The emergency room visit occurred about 3 weeks ago.  The headache was more intense than usual.  It was occipital and also bilateral.  She describes it as a sharp pain in the back of her head.  With another headache she had some right-sided numbness and weakness.  She took Excedrin but it did not do much.  The headache lasted about 12 hours.  It was not associated with sensitivity to noise or lights and there was no nausea.  She was able to function during the headache, so was not incapacitated by it.      PHYSICAL EXAMINATION:   GENERAL:  The patient is cooperative and in no distress.   VITAL SIGNS:  Her blood pressure is 120/80.   NEUROLOGIC:  Visual acuity 20/20 bilaterally.  Funduscopic exam shows sharp discs bilaterally.  Finger-nose-finger and heel-knee-shin are normal.  She easily performs a tandem gait.      ASSESSMENT:   1.  Headache disorder, not otherwise classified.   2.  Sensory disturbance, resolved.      DISCUSSION:  The patient is seen with the above problem list.  The new issue is the headache.  She has 2 types of headache.  One is a sharp occipital type head pain that occurs sporadically and the other is a headache that on one occasion was associated with a right-sided sensory disturbance.  Her exam on these points is normal.  She does not have a history of migraine and this history is suggestive in that regard, but not definitive.  She  does not have sensitivity to noise or lights or other associated type symptoms.  Her exam is normal.  I am going to obtain an MRI scan of the brain to make sure there is no structural lesion that would explain this new onset of headache since I saw her in November.  I will communicate the results of the tests to her when available.  Followup will be on an as as-needed basis.      MD SOLIS Victor MD             D: 2020   T: 2020   MT: AKA      Name:     GAUDENCIO SALAZAR   MRN:      [id number]        Account:      [id number]   :      1995           Service Date: 2020      Document: [id number]         addendum: reviewed MRI findings with pt.  Gliotic change likely of not clinical relevance.  Recommend f/u to discuss.

## 2020-01-07 ENCOUNTER — OFFICE VISIT (OUTPATIENT)
Dept: INTERNAL MEDICINE | Facility: CLINIC | Age: 25
End: 2020-01-07
Payer: COMMERCIAL

## 2020-01-07 ENCOUNTER — TELEPHONE (OUTPATIENT)
Dept: PALLIATIVE MEDICINE | Facility: CLINIC | Age: 25
End: 2020-01-07

## 2020-01-07 VITALS
HEART RATE: 90 BPM | SYSTOLIC BLOOD PRESSURE: 104 MMHG | WEIGHT: 124.3 LBS | BODY MASS INDEX: 22.02 KG/M2 | RESPIRATION RATE: 16 BRPM | OXYGEN SATURATION: 98 % | DIASTOLIC BLOOD PRESSURE: 70 MMHG

## 2020-01-07 DIAGNOSIS — Z23 NEED FOR PROPHYLACTIC VACCINATION AND INOCULATION AGAINST INFLUENZA: ICD-10-CM

## 2020-01-07 DIAGNOSIS — R51.9 OCCIPITAL HEADACHE: Primary | ICD-10-CM

## 2020-01-07 PROCEDURE — 99214 OFFICE O/P EST MOD 30 MIN: CPT | Mod: 25 | Performed by: INTERNAL MEDICINE

## 2020-01-07 PROCEDURE — 90471 IMMUNIZATION ADMIN: CPT | Performed by: INTERNAL MEDICINE

## 2020-01-07 PROCEDURE — 90686 IIV4 VACC NO PRSV 0.5 ML IM: CPT | Performed by: INTERNAL MEDICINE

## 2020-01-07 NOTE — PROGRESS NOTES
SUBJECTIVE:                                                      HPI: Rebecca Huerta is a pleasant 25 year old female who presents with headaches:    - ongoing for couple of months  - indicates occipital area, left side  - headaches occur 4-5 times/week and last 4 to 6 hours  - describes pain as throbbing and buzzing in quality  - mild to moderate in severity    - no nausea or vomiting  - no double or blurry vision  - no focal weakness, numbness, or tingling but does report occasional twitches     Recent labs within normal limits.    No significant improvement with physical therapy or nortriptyline (prescribed by neurology). Excedrin takes the edge off, but the buzzing continues.    Patient would also like a flu shot today.    The medication, allergy, and problem lists have been reviewed and updated as appropriate.     OBJECTIVE:                                                      /70   Pulse 90   Resp 16   Wt 56.4 kg (124 lb 4.8 oz)   LMP 12/28/2019 (Exact Date)   SpO2 98%   BMI 22.02 kg/m    Constitutional: well-appearing  Neck: supple, symmetric, no thyromegaly or lymphadenopathy; normal range of motion  Cervical spine: no deformity, crepitus, or step-off; no spinal or paraspinal tenderness to palpation    ASSESSMENT/PLAN:                                                      (R51) Occipital headache  (primary encounter diagnosis)  Plan: suspect occipital neuralgia; referred to pain management for possible occipital nerve injection - patient will be contacted to schedule.    (Z23) Need for prophylactic vaccination and inoculation against influenza  Plan: flu shot given today.    The instructions on the AVS were discussed and explained to the patient. Patient expressed understanding of instructions.    (Chart documentation was completed, in part, with [employer] voice-recognition software. Even though reviewed, some grammatical, spelling, and word errors may remain.)    Acacia Lipscomb MD   [facility]  [facility] [place] [address] [address] [address]  T: [phone], F: [phone]

## 2020-01-07 NOTE — TELEPHONE ENCOUNTER
Order received from Acacia Lipscomb MD    Procedure Order TBD by pain provider - Location: occipital area    Routing to providers to review.    Carolyn KOO    [facility]

## 2020-01-07 NOTE — TELEPHONE ENCOUNTER
Schedule for an occipital nerve block.    Xiomara Hammond MD  [facility]

## 2020-01-08 NOTE — TELEPHONE ENCOUNTER
RODRI to schedule occipital nerve block.    Carolyn KOO    [facility]

## 2020-01-12 ENCOUNTER — ANCILLARY PROCEDURE (OUTPATIENT)
Dept: MRI IMAGING | Facility: CLINIC | Age: 25
End: 2020-01-12
Attending: PSYCHIATRY & NEUROLOGY
Payer: COMMERCIAL

## 2020-01-12 DIAGNOSIS — G44.209 TENSION HEADACHE: ICD-10-CM

## 2020-01-12 RX ORDER — GADOBUTROL 604.72 MG/ML
7.5 INJECTION INTRAVENOUS ONCE
Status: COMPLETED | OUTPATIENT
Start: 2020-01-12 | End: 2020-01-12

## 2020-01-12 RX ADMIN — GADOBUTROL 6 ML: 604.72 INJECTION INTRAVENOUS at 14:07

## 2020-01-13 ENCOUNTER — OFFICE VISIT (OUTPATIENT)
Dept: PALLIATIVE MEDICINE | Facility: CLINIC | Age: 25
End: 2020-01-13
Payer: COMMERCIAL

## 2020-01-13 VITALS — SYSTOLIC BLOOD PRESSURE: 116 MMHG | OXYGEN SATURATION: 98 % | HEART RATE: 102 BPM | DIASTOLIC BLOOD PRESSURE: 72 MMHG

## 2020-01-13 DIAGNOSIS — M54.81 BILATERAL OCCIPITAL NEURALGIA: Primary | ICD-10-CM

## 2020-01-13 RX ADMIN — TRIAMCINOLONE ACETONIDE 40 MG: 40 INJECTION, SUSPENSION INTRA-ARTICULAR; INTRAMUSCULAR at 16:00

## 2020-01-13 ASSESSMENT — PAIN SCALES - GENERAL: PAINLEVEL: MILD PAIN (2)

## 2020-01-13 NOTE — PATIENT INSTRUCTIONS
[facility]   Post Procedure Instructions    Today you had: occipital nerve block       Medications used:   bupivicaine   kenolog             Go to the emergency room if you develop any shortness of breath    Monitor the injection sites for signs and symptoms of infection-fever, chills, redness, swelling, warmth, or drainage to areas.    You may have soreness at injection sites for up to 24 hours.    You may apply ice to the painful areas to help minimize the discomfort of the needle pokes.    Do not apply heat to sites for at least 12 hours.    You may use anti-inflammatory medications or Tylenol for pain control if necessary    Pain Clinic phone number during work hours Monday-Friday:  [phone]    After hours provider line: [phone]

## 2020-01-13 NOTE — PROGRESS NOTES
Pre procedure Diagnosis: occipital neuralgia   Post procedure Diagnosis: Same  Procedure performed: Bilateral occipital nerve block  Anesthe bilateral Yvonne: none  Complications: NONE  Operators: MD Bettie     Indications:   Rebecca Huerta is a 25 year old female with a history of bilateral pain over her occiput causing headaches. Pt has tried conservative treatment including meds.    Options/alternatives, benefits and risks were discussed with the patient including bleeding, infection, hematoma, nerve damage, stroke, and spinal cord injury.  Questions were answered to her satisfaction and she agrees to proceed. Voluntary informed consent was obtained and signed.     Vitals were reviewed: Yes  Allergies were reviewed:  Yes   Medications were reviewed:  Yes   Pre-procedure pain score: 2/10    Procedure:  After getting informed consent, a Pause for the Cause was performed.    The occipital ridge, occipital protuberance, and mastoid process were palpated on the RIGHT/LEFT side.  The location of maximal tenderness which was consistent with the location of the occipital nerve was palpated.  The area was cleaned.    Palpation for a pulse was completed, with no pulse noted at the site of the injection.  A 25G, 1.5 inch needle was introduced, aimed cephalad, in the superficial tissues at this area of tenderness.  The injection was completed at this location, fanning in 4 different directions.  Aspiration for heme was negative before all injections.    In total, 5ml of 0.5% bupivacaine and 40 mg of Kenalog was injected.     The patient tolerated the procedure well, hemostasis was achieved.      Post-procedure pain score: 0/10  Follow-up includes:   -f/u with Referring provider  -schedule prn, can be double booked    Tripp Rojas MD  [facility]

## 2020-01-14 PROCEDURE — 64405 NJX AA&/STRD GR OCPL NRV: CPT | Mod: 50 | Performed by: PAIN MEDICINE

## 2020-01-14 RX ORDER — TRIAMCINOLONE ACETONIDE 40 MG/ML
40 INJECTION, SUSPENSION INTRA-ARTICULAR; INTRAMUSCULAR ONCE
Status: COMPLETED | OUTPATIENT
Start: 2020-01-14 | End: 2020-01-13

## 2020-01-14 RX ORDER — BUPIVACAINE HYDROCHLORIDE 5 MG/ML
5 INJECTION, SOLUTION PERINEURAL ONCE
Status: COMPLETED | OUTPATIENT
Start: 2020-01-14 | End: 2020-01-14

## 2020-01-14 RX ADMIN — BUPIVACAINE HYDROCHLORIDE 25 MG: 5 INJECTION, SOLUTION PERINEURAL at 15:13

## 2020-01-24 PROBLEM — M54.2 NECK PAIN: Status: RESOLVED | Noted: 2019-11-22 | Resolved: 2020-01-24

## 2020-03-02 ASSESSMENT — ENCOUNTER SYMPTOMS
COUGH DISTURBING SLEEP: 1
COUGH: 0
SNORES LOUDLY: 0
POSTURAL DYSPNEA: 0
TINGLING: 0
SEIZURES: 0
DYSPNEA ON EXERTION: 0
HEADACHES: 1
SPEECH CHANGE: 0
SHORTNESS OF BREATH: 0
HEMOPTYSIS: 0
NUMBNESS: 0
MEMORY LOSS: 0
LOSS OF CONSCIOUSNESS: 0
DIZZINESS: 0
WEAKNESS: 0
PARALYSIS: 0
TREMORS: 0
DISTURBANCES IN COORDINATION: 0
WHEEZING: 0
SPUTUM PRODUCTION: 0

## 2020-03-11 ENCOUNTER — HEALTH MAINTENANCE LETTER (OUTPATIENT)
Age: 25
End: 2020-03-11

## 2020-03-14 ENCOUNTER — OFFICE VISIT (OUTPATIENT)
Dept: NEUROLOGY | Facility: CLINIC | Age: 25
End: 2020-03-14
Payer: COMMERCIAL

## 2020-03-14 DIAGNOSIS — R51.9 HEADACHE, UNSPECIFIED HEADACHE TYPE: Primary | ICD-10-CM

## 2020-03-14 NOTE — LETTER
3/14/2020       RE: Rebecca Huerta  [address]  [address]  [place] [address]     Dear Colleague,    Thank you for referring your patient, Rebecca Huerta, to the [facility] at [facility]. Please see a copy of my visit note below.    Service Date: 03/14/2020      HISTORY OF PRESENT ILLNESS:  This patient is seen in followup with headache.  I last saw the patient about 2 months ago.  She was having more severe headaches in November and December, some of them associated with numbness and also weakness.  She does not have a prior history of migraine.  I did obtain an MRI scan of the brain.  I have reviewed those images with her.  There is gliotic change in the cerebral white matter.  The areas are nonspecific.  She has no prior history of head injury.  She reports that more serious headaches have not returned.  She had graded those more severe headaches as a 7 or 8 on a scale of 10.  Now she is having different types of headaches.  These are milder.  They are sporadic.  They will last for a few days or sometimes a couple of weeks.  They are present in the back of her neck and head and on the top of her head.  She had tried Excedrin without benefit, but Aleve does help get rid of the headache.  She uses about 3 or 4 tablets of Aleve each week.  She no longer is on the nortriptyline.  She also is exercising on a regular basis now and thinks that, too, is helpful.      PHYSICAL EXAMINATION:   VITAL SIGNS:  Her blood pressure is 100/65.   NEUROLOGIC:  Visual acuity 20/20 bilaterally.  Funduscopic exam shows sharp discs bilaterally.      ASSESSMENT:  Tension headache syndrome.      DISCUSSION:  This patient is seen in followup with ongoing issues of headache.  It sounds most like tension headache.  She gets reasonable response to a modest intervention with Aleve.  I would continue this for now.  She thinks her situation is overall improving.  I have no further  recommendations for intervention.  She should follow up on an as-needed basis.      MD SOLIS Schaffer MD             D: 2020   T: 2020   MT: al      Name:     GAUDENCIO SALAZAR   MRN:      [id number]        Account:      [id number]   :      1995           Service Date: 2020      Document: [id number]       Again, thank you for allowing me to participate in the care of your patient.      Sincerely,    Solis Yo MD

## 2020-03-14 NOTE — PROGRESS NOTES
Service Date: 2020      HISTORY OF PRESENT ILLNESS:  This patient is seen in followup with headache.  I last saw the patient about 2 months ago.  She was having more severe headaches in November and December, some of them associated with numbness and also weakness.  She does not have a prior history of migraine.  I did obtain an MRI scan of the brain.  I have reviewed those images with her.  There is gliotic change in the cerebral white matter.  The areas are nonspecific.  She has no prior history of head injury.  She reports that more serious headaches have not returned.  She had graded those more severe headaches as a 7 or 8 on a scale of 10.  Now she is having different types of headaches.  These are milder.  They are sporadic.  They will last for a few days or sometimes a couple of weeks.  They are present in the back of her neck and head and on the top of her head.  She had tried Excedrin without benefit, but Aleve does help get rid of the headache.  She uses about 3 or 4 tablets of Aleve each week.  She no longer is on the nortriptyline.  She also is exercising on a regular basis now and thinks that, too, is helpful.      PHYSICAL EXAMINATION:   VITAL SIGNS:  Her blood pressure is 100/65.   NEUROLOGIC:  Visual acuity 20/20 bilaterally.  Funduscopic exam shows sharp discs bilaterally.      ASSESSMENT:  Tension headache syndrome.      DISCUSSION:  This patient is seen in followup with ongoing issues of headache.  It sounds most like tension headache.  She gets reasonable response to a modest intervention with Aleve.  I would continue this for now.  She thinks her situation is overall improving.  I have no further recommendations for intervention.  She should follow up on an as-needed basis.      MD SOLIS Schaffer MD             D: 2020   T: 2020   MT: al      Name:     GAUDENCIO SALAZAR   MRN:      [id number]        Account:      [id number]   :      1995            Service Date: 03/14/2020      Document: [id number]

## 2020-09-22 ENCOUNTER — OFFICE VISIT (OUTPATIENT)
Dept: URGENT CARE | Facility: URGENT CARE | Age: 25
End: 2020-09-22
Payer: COMMERCIAL

## 2020-09-22 VITALS
HEART RATE: 82 BPM | OXYGEN SATURATION: 100 % | TEMPERATURE: 97.8 F | DIASTOLIC BLOOD PRESSURE: 80 MMHG | BODY MASS INDEX: 23.03 KG/M2 | RESPIRATION RATE: 16 BRPM | SYSTOLIC BLOOD PRESSURE: 108 MMHG | WEIGHT: 130 LBS

## 2020-09-22 DIAGNOSIS — R10.31 ABDOMINAL PAIN, RIGHT LOWER QUADRANT: Primary | ICD-10-CM

## 2020-09-22 LAB
ALBUMIN UR-MCNC: NEGATIVE MG/DL
APPEARANCE UR: CLEAR
BACTERIA #/AREA URNS HPF: ABNORMAL /HPF
BASOPHILS # BLD AUTO: 0 10E9/L (ref 0–0.2)
BASOPHILS NFR BLD AUTO: 0.4 %
BILIRUB UR QL STRIP: NEGATIVE
COLOR UR AUTO: YELLOW
DIFFERENTIAL METHOD BLD: NORMAL
EOSINOPHIL # BLD AUTO: 0.2 10E9/L (ref 0–0.7)
EOSINOPHIL NFR BLD AUTO: 3 %
ERYTHROCYTE [DISTWIDTH] IN BLOOD BY AUTOMATED COUNT: 12.2 % (ref 10–15)
GLUCOSE UR STRIP-MCNC: NEGATIVE MG/DL
HCG UR QL: NEGATIVE
HCT VFR BLD AUTO: 38 % (ref 35–47)
HGB BLD-MCNC: 12.4 G/DL (ref 11.7–15.7)
HGB UR QL STRIP: ABNORMAL
KETONES UR STRIP-MCNC: NEGATIVE MG/DL
LEUKOCYTE ESTERASE UR QL STRIP: ABNORMAL
LYMPHOCYTES # BLD AUTO: 2.7 10E9/L (ref 0.8–5.3)
LYMPHOCYTES NFR BLD AUTO: 37 %
MCH RBC QN AUTO: 28.5 PG (ref 26.5–33)
MCHC RBC AUTO-ENTMCNC: 32.6 G/DL (ref 31.5–36.5)
MCV RBC AUTO: 87 FL (ref 78–100)
MONOCYTES # BLD AUTO: 0.5 10E9/L (ref 0–1.3)
MONOCYTES NFR BLD AUTO: 7.3 %
NEUTROPHILS # BLD AUTO: 3.8 10E9/L (ref 1.6–8.3)
NEUTROPHILS NFR BLD AUTO: 52.3 %
NITRATE UR QL: NEGATIVE
NON-SQ EPI CELLS #/AREA URNS LPF: ABNORMAL /LPF
PH UR STRIP: 7 PH (ref 5–7)
PLATELET # BLD AUTO: 248 10E9/L (ref 150–450)
RBC # BLD AUTO: 4.35 10E12/L (ref 3.8–5.2)
RBC #/AREA URNS AUTO: ABNORMAL /HPF
SOURCE: ABNORMAL
SP GR UR STRIP: 1.01 (ref 1–1.03)
SPECIMEN SOURCE: NORMAL
UROBILINOGEN UR STRIP-ACNC: 0.2 EU/DL (ref 0.2–1)
WBC # BLD AUTO: 7.2 10E9/L (ref 4–11)
WBC #/AREA URNS AUTO: ABNORMAL /HPF
WET PREP SPEC: NORMAL

## 2020-09-22 PROCEDURE — 81001 URINALYSIS AUTO W/SCOPE: CPT | Performed by: PHYSICIAN ASSISTANT

## 2020-09-22 PROCEDURE — 81025 URINE PREGNANCY TEST: CPT | Performed by: FAMILY MEDICINE

## 2020-09-22 PROCEDURE — 85025 COMPLETE CBC W/AUTO DIFF WBC: CPT | Performed by: FAMILY MEDICINE

## 2020-09-22 PROCEDURE — 87491 CHLMYD TRACH DNA AMP PROBE: CPT | Performed by: FAMILY MEDICINE

## 2020-09-22 PROCEDURE — 87210 SMEAR WET MOUNT SALINE/INK: CPT | Performed by: FAMILY MEDICINE

## 2020-09-22 PROCEDURE — 36415 COLL VENOUS BLD VENIPUNCTURE: CPT | Performed by: FAMILY MEDICINE

## 2020-09-22 PROCEDURE — 87591 N.GONORRHOEAE DNA AMP PROB: CPT | Performed by: FAMILY MEDICINE

## 2020-09-22 PROCEDURE — 99214 OFFICE O/P EST MOD 30 MIN: CPT | Performed by: FAMILY MEDICINE

## 2020-09-22 PROCEDURE — 87086 URINE CULTURE/COLONY COUNT: CPT | Performed by: FAMILY MEDICINE

## 2020-09-22 NOTE — NURSING NOTE
"Vital signs:  Temp: 97.8  F (36.6  C) Temp src: Oral BP: 108/80 Pulse: 82   Resp: 16 SpO2: 100 %       Weight: 59 kg (130 lb)  Estimated body mass index is 23.03 kg/m  as calculated from the following:    Height as of 12/15/19: 1.6 m (5' 3\").    Weight as of this encounter: 59 kg (130 lb).          "

## 2020-09-22 NOTE — PROGRESS NOTES
"SUBJECTIVE  HPI: Rebecca Huerta is a 25 year old female  who presents with the CC of abdominal/pelvic pain.   Pain is located in the RLQ area, with radiation to None    The pain is characterized as \"pain\".    Pain has been present since 9am  and is stable.     EXACERBATING FACTORS: NEGATIVE.   RELIEVING FACTORS: NEGATIVE.    ASSOCIATED SX: none.     Past Medical History:   Diagnosis Date     Anxiety      NO ACTIVE PROBLEMS      No Known Allergies  Social History     Tobacco Use     Smoking status: Never Smoker     Smokeless tobacco: Never Used   Substance Use Topics     Alcohol use: No       ROS:CONSTITUTIONAL:NEGATIVE for fever, chills, change in weight  GI: NEGATIVE for constipation, diarrhea, nausea and vomiting  : normal menstrual cycles, negative for, dysuria and hematuria    EXAMINATION:  /80   Pulse 82   Temp 97.8  F (36.6  C) (Oral)   Resp 16   Wt 59 kg (130 lb)   LMP 09/18/2020   SpO2 100%   BMI 23.03 kg/m     GENERAL APPEARANCE: healthy, alert and no distress  ABDOMEN: soft, normal bowel sounds, tenderness mild and moderate RLQ  No CVA pain      ICD-10-CM    1. Abdominal pain, right lower quadrant  R10.31 *UA reflex to Microscopic and Culture ([facility] (except [place] and [place])     CBC with platelets differential     NEISSERIA GONORRHOEA PCR     CHLAMYDIA TRACHOMATIS PCR     Wet prep     HCG qualitative urine     Urine Microscopic     Urine Culture Aerobic Bacterial          F/U GYN and ED if worse, likely needs a US      "

## 2020-09-23 LAB
BACTERIA SPEC CULT: NORMAL
C TRACH DNA SPEC QL NAA+PROBE: NEGATIVE
N GONORRHOEA DNA SPEC QL NAA+PROBE: NEGATIVE
SPECIMEN SOURCE: NORMAL

## 2020-12-27 ENCOUNTER — HEALTH MAINTENANCE LETTER (OUTPATIENT)
Age: 25
End: 2020-12-27

## 2021-04-12 NOTE — ED AVS SNAPSHOT
Emergency Department  [address] [place] [address]  Phone:  [phone]  Fax:  [phone]                                    Rebecca Huerta   MRN: [id number]    Department:   Emergency Department   Date of Visit:  11/13/2019           After Visit Summary Signature Page    I have received my discharge instructions, and my questions have been answered. I have discussed any challenges I see with this plan with the nurse or doctor.    ..........................................................................................................................................  Patient/Patient Representative Signature      ..........................................................................................................................................  Patient Representative Print Name and Relationship to Patient    ..................................................               ................................................  Date                                   Time    ..........................................................................................................................................  Reviewed by Signature/Title    ...................................................              ..............................................  Date                                               Time          22EPIC Rev 08/18       
No

## 2021-09-28 PROBLEM — R51.9 HEADACHE, UNSPECIFIED HEADACHE TYPE: Status: RESOLVED | Noted: 2019-11-06 | Resolved: 2021-09-28

## 2021-10-05 ENCOUNTER — VIRTUAL VISIT (OUTPATIENT)
Dept: INTERNAL MEDICINE | Facility: CLINIC | Age: 26
End: 2021-10-05
Payer: COMMERCIAL

## 2021-10-05 DIAGNOSIS — Z30.09 BIRTH CONTROL COUNSELING: Primary | ICD-10-CM

## 2021-10-05 PROCEDURE — 99213 OFFICE O/P EST LOW 20 MIN: CPT | Mod: TEL | Performed by: INTERNAL MEDICINE

## 2021-10-05 NOTE — PROGRESS NOTES
TELEPHONE VISIT                                                      ASSESSMENT/PLAN                                                      (Z30.09) Birth control counseling  (primary encounter diagnosis)  Comment: Patient is interested in an implant versus OCPs.  Plan: OB/GYN referral placed in case patient would like to pursue an implant; if patient would like to proceed with OCPs, she may send me a [employer] message.    Total time of call between patient and provider was 8 minutes. Provider location: office. Patient location: home.    Acacia Lipscomb MD   [address] [address]  T: [phone], F: [phone]    SUBJECTIVE                                                      Rebecca Huerta is a very pleasant 26 year old female who requested a telephone visit to discuss birth control:    She is interested in birth control for contraceptive purposes (not for period regulation or acne).  Has not used any type of birth control before.    Discussed multiple types of birth control including OCPs, IUDs, implant, vaginal ring, injections, condoms, diaphragms, spermicides, rhythm method, and pullout method The latter two were not recommended due to high failure rate. Discussed benefits, drawbacks, potential side effects of, and need for placement remaining options.    Patient is interested in an implant versus OCPs. She is aware that neither of these protect her against STDs.    ---    (Note was completed, in part, with [employer] voice-recognition software. Documentation reviewed, but some grammatical, spelling, and word errors may remain.)

## 2022-01-29 ENCOUNTER — HEALTH MAINTENANCE LETTER (OUTPATIENT)
Age: 27
End: 2022-01-29

## 2022-09-17 ENCOUNTER — HEALTH MAINTENANCE LETTER (OUTPATIENT)
Age: 27
End: 2022-09-17

## 2023-05-06 ENCOUNTER — HEALTH MAINTENANCE LETTER (OUTPATIENT)
Age: 28
End: 2023-05-06

## 2024-07-13 ENCOUNTER — HEALTH MAINTENANCE LETTER (OUTPATIENT)
Age: 29
End: 2024-07-13